# Patient Record
Sex: FEMALE | Race: WHITE | NOT HISPANIC OR LATINO | Employment: OTHER | URBAN - METROPOLITAN AREA
[De-identification: names, ages, dates, MRNs, and addresses within clinical notes are randomized per-mention and may not be internally consistent; named-entity substitution may affect disease eponyms.]

---

## 2018-05-23 ENCOUNTER — HOSPITAL ENCOUNTER (OUTPATIENT)
Dept: RADIOLOGY | Facility: HOSPITAL | Age: 58
Discharge: HOME | End: 2018-05-23
Attending: OBSTETRICS & GYNECOLOGY
Payer: COMMERCIAL

## 2018-05-23 ENCOUNTER — OFFICE VISIT (OUTPATIENT)
Dept: OBSTETRICS AND GYNECOLOGY | Facility: CLINIC | Age: 58
End: 2018-05-23
Payer: COMMERCIAL

## 2018-05-23 VITALS — HEIGHT: 66 IN

## 2018-05-23 DIAGNOSIS — B97.7 HPV (HUMAN PAPILLOMA VIRUS) INFECTION: ICD-10-CM

## 2018-05-23 DIAGNOSIS — Z01.419 WELL WOMAN EXAM WITH ROUTINE GYNECOLOGICAL EXAM: ICD-10-CM

## 2018-05-23 DIAGNOSIS — Z01.419 WELL WOMAN EXAM WITH ROUTINE GYNECOLOGICAL EXAM: Primary | ICD-10-CM

## 2018-05-23 PROCEDURE — 77067 SCR MAMMO BI INCL CAD: CPT

## 2018-05-23 PROCEDURE — S0612 ANNUAL GYNECOLOGICAL EXAMINA: HCPCS | Performed by: OBSTETRICS & GYNECOLOGY

## 2018-05-23 RX ORDER — PREGABALIN 150 MG/1
CAPSULE ORAL
Refills: 0 | COMMUNITY
Start: 2018-05-14

## 2018-05-23 RX ORDER — METFORMIN HYDROCHLORIDE 500 MG/1
TABLET, EXTENDED RELEASE ORAL
Refills: 2 | Status: ON HOLD | COMMUNITY
Start: 2018-05-03 | End: 2021-04-17 | Stop reason: SDUPTHER

## 2018-05-23 RX ORDER — MELOXICAM 15 MG/1
TABLET ORAL
Refills: 2 | COMMUNITY
Start: 2018-05-14 | End: 2021-04-17 | Stop reason: HOSPADM

## 2018-05-23 RX ORDER — ATORVASTATIN CALCIUM 20 MG/1
20 TABLET, FILM COATED ORAL NIGHTLY
Refills: 0 | COMMUNITY
Start: 2018-04-20

## 2018-05-23 RX ORDER — CITALOPRAM 10 MG/1
5 TABLET ORAL
Refills: 2 | COMMUNITY
Start: 2018-04-25

## 2018-05-23 ASSESSMENT — ENCOUNTER SYMPTOMS
CONSTITUTIONAL NEGATIVE: 1
GASTROINTESTINAL NEGATIVE: 1
PSYCHIATRIC NEGATIVE: 1
CARDIOVASCULAR NEGATIVE: 1
NEUROLOGICAL NEGATIVE: 1
RESPIRATORY NEGATIVE: 1
ENDOCRINE NEGATIVE: 1

## 2018-05-23 NOTE — PROGRESS NOTES
"2018  Maylin Stiles is a 57 y.o. female who presents for annual exam.   Periods are absent, denies bleeding, hot flashes improving  The patient is sexually active.  GYN screening history: last pap: was abnormal: Positive HPV, normal Pap. Domestic violence: no.     Current contraception: post menopausal status  History of abnormal Pap smear: yes - Positive HPV  Family history of uterine or ovarian cancer: no  Regular self breast exam: yes  History of abnormal mammogram: no  Family history of breast cancer: no  History of abnormal lipids: yes - Medication  Menstrual History:  OB History      Para Term  AB Living    2       1 1    SAB TAB Ectopic Multiple Live Births    1                 Menarche age: 11  No LMP recorded. Patient is postmenopausal.         Review of Systems and Physical Exam  The following have been reviewed and updated as appropriate in this visit:      Ht 1.676 m (5' 6\")   Breastfeeding? No   HPI  Review of Systems   Constitutional: Negative.    HENT: Negative.    Respiratory: Negative.    Cardiovascular: Negative.    Gastrointestinal: Negative.    Endocrine: Negative.    Genitourinary: Negative.    Breast: Negative.   Neurological: Negative.    Psychiatric/Behavioral: Negative.      Physical Exam   Constitutional: She appears well-developed and well-nourished. She is cooperative.   Genitourinary: Uterus normal. Body Habitus limits findings.Pelvic exam was performed with patient in lithotomy exam position.   No labial rash. No signs of erythema. There is no injury on the right external genitalia. There is no injury on the left external genitalia.   Urethral meatus normal.   Urethra normal.   Normal bladder. Vagina exhibits no lesion. No erythema or tenderness in the vagina. No signs of injury around the vagina. No vaginal discharge found. There is no vaginal vault prolapse, cystocele, uterine prolapse, rectocele or enterocele present. Perineum intact  Cervix exam " normal.  Uterus is normal. Uterine contour is regular.   Right adnexum non-palpable.   Left adnexum non-palpable.   HENT:   Head: Normocephalic.   Eyes: Conjunctivae and lids are normal.   Cardiovascular: Normal rate and regular rhythm.    Pulmonary/Chest: Right breast exhibits no mass, no nipple discharge and no skin change. Left breast exhibits no mass, no nipple discharge and no skin change.   Abdominal: Soft. Normal appearance.   Musculoskeletal: Normal range of motion.   Neurological: She is alert.   Skin: Skin is intact.   Psychiatric: She has a normal mood and affect. Her speech is normal and behavior is normal.       Problem List Items Addressed This Visit     Well woman exam with routine gynecological exam - Primary     Calcium, vitamin D, weightbearing exercise         HPV (human papilloma virus) infection     Negative pathology on colposcopy  Pap was negative at time of positive HPV  Check Pap results           .  All questions answered.  Await pap smear results.  Breast self exam technique reviewed and patient encouraged to perform self-exam monthly.  Discussed healthy lifestyle modifications.  Educational material distributed.  Follow up as needed.  Mammogram.  Thin prep Pap smear..  Return in about 1 year (around 5/23/2019).  Analy Soto MD

## 2018-05-23 NOTE — PATIENT INSTRUCTIONS
Patient Education     Bone Health  Bones protect organs, store calcium, and anchor muscles. Good health habits, such as eating nutritious foods and exercising regularly, are important for maintaining healthy bones. They can also help to prevent a condition that causes bones to lose density and become weak and brittle (osteoporosis).  Why is bone mass important?  Bone mass refers to the amount of bone tissue that you have. The higher your bone mass, the stronger your bones. An important step toward having healthy bones throughout life is to have strong and dense bones during childhood. A young adult who has a high bone mass is more likely to have a high bone mass later in life. Bone mass at its greatest it is called peak bone mass.  A large decline in bone mass occurs in older adults. In women, it occurs about the time of menopause. During this time, it is important to practice good health habits, because if more bone is lost than what is replaced, the bones will become less healthy and more likely to break (fracture). If you find that you have a low bone mass, you may be able to prevent osteoporosis or further bone loss by changing your diet and lifestyle.  How can I find out if my bone mass is low?  Bone mass can be measured with an X-ray test that is called a bone mineral density (BMD) test. This test is recommended for all women who are age 65 or older. It may also be recommended for men who are age 70 or older, or for people who are more likely to develop osteoporosis due to:  · Having bones that break easily.  · Having a long-term disease that weakens bones, such as kidney disease or rheumatoid arthritis.  · Having menopause earlier than normal.  · Taking medicine that weakens bones, such as steroids, thyroid hormones, or hormone treatment for breast cancer or prostate cancer.  · Smoking.  · Drinking three or more alcoholic drinks each day.  What are the nutritional recommendations for healthy bones?  To have  healthy bones, you need to get enough of the right minerals and vitamins. Most nutrition experts recommend getting these nutrients from the foods that you eat. Nutritional recommendations vary from person to person. Ask your health care provider what is healthy for you. Here are some general guidelines.  Calcium Recommendations  Calcium is the most important (essential) mineral for bone health. Most people can get enough calcium from their diet, but supplements may be recommended for people who are at risk for osteoporosis. Good sources of calcium include:  · Dairy products, such as low-fat or nonfat milk, cheese, and yogurt.  · Dark green leafy vegetables, such as bok david and broccoli.  · Calcium-fortified foods, such as orange juice, cereal, bread, soy beverages, and tofu products.  · Nuts, such as almonds.  Follow these recommended amounts for daily calcium intake:  · Children, age 1?3: 700 mg.  · Children, age 4?8: 1,000 mg.  · Children, age 9?13: 1,300 mg.  · Teens, age 14?18: 1,300 mg.  · Adults, age 19?50: 1,000 mg.  · Adults, age 51?70:  ¨ Men: 1,000 mg.  ¨ Women: 1,200 mg.  · Adults, age 71 or older: 1,200 mg.  · Pregnant and breastfeeding females:  ¨ Teens: 1,300 mg.  ¨ Adults: 1,000 mg.  Vitamin D Recommendations  Vitamin D is the most essential vitamin for bone health. It helps the body to absorb calcium. Sunlight stimulates the skin to make vitamin D, so be sure to get enough sunlight. If you live in a cold climate or you do not get outside often, your health care provider may recommend that you take vitamin D supplements. Good sources of vitamin D in your diet include:  · Egg yolks.  · Saltwater fish.  · Milk and cereal fortified with vitamin D.  Follow these recommended amounts for daily vitamin D intake:  · Children and teens, age 1?18: 600 international units.  · Adults, age 50 or younger: 400-800 international units.  · Adults, age 51 or older: 800-1,000 international units.  Other  Nutrients  Other nutrients for bone health include:  · Phosphorus. This mineral is found in meat, poultry, dairy foods, nuts, and legumes. The recommended daily intake for adult men and adult women is 700 mg.  · Magnesium. This mineral is found in seeds, nuts, dark green vegetables, and legumes. The recommended daily intake for adult men is 400?420 mg. For adult women, it is 310?320 mg.  · Vitamin K. This vitamin is found in green leafy vegetables. The recommended daily intake is 120 mg for adult men and 90 mg for adult women.  What type of physical activity is best for building and maintaining healthy bones?  Weight-bearing and strength-building activities are important for building and maintaining peak bone mass. Weight-bearing activities cause muscles and bones to work against gravity. Strength-building activities increases muscle strength that supports bones. Weight-bearing and muscle-building activities include:  · Walking and hiking.  · Jogging and running.  · Dancing.  · Gym exercises.  · Lifting weights.  · Tennis and racquetball.  · Climbing stairs.  · Aerobics.  Adults should get at least 30 minutes of moderate physical activity on most days. Children should get at least 60 minutes of moderate physical activity on most days. Ask your health care provide what type of exercise is best for you.  Where can I find more information?  For more information, check out the following websites:  · National Osteoporosis Foundation: http://nof.org/learn/basics  · National Institutes of Health: http://www.niams.nih.gov/Health_Info/Bone/Bone_Health/bone_health_for_life.asp  This information is not intended to replace advice given to you by your health care provider. Make sure you discuss any questions you have with your health care provider.  Document Released: 03/09/2005 Document Revised: 07/07/2017 Document Reviewed: 12/23/2015  [employer] Patient Education © 2018 [employer]

## 2018-06-05 LAB
CYTOLOGIST CVX/VAG CYTO: ABNORMAL
CYTOLOGY CVX/VAG DOC THIN PREP: ABNORMAL
DX ICD CODE: ABNORMAL
HPV I/H RISK 4 DNA CVX QL PROBE+SIG AMP: POSITIVE
HPV16 DNA CVX QL PROBE+SIG AMP: NEGATIVE
HPV18+45 E6+E7 MRNA CVX QL NAA+PROBE: NEGATIVE
LAB CORP NOTE:: ABNORMAL
OTHER STN SPEC: ABNORMAL
PATH REPORT.FINAL DX SPEC: ABNORMAL
STAT OF ADQ CVX/VAG CYTO-IMP: ABNORMAL

## 2018-08-08 ENCOUNTER — TELEPHONE (OUTPATIENT)
Dept: OBSTETRICS AND GYNECOLOGY | Facility: CLINIC | Age: 58
End: 2018-08-08

## 2019-05-08 ENCOUNTER — HOSPITAL ENCOUNTER (OUTPATIENT)
Facility: CLINIC | Age: 59
Discharge: HOME | End: 2019-05-08
Attending: EMERGENCY MEDICINE
Payer: COMMERCIAL

## 2019-05-08 VITALS
OXYGEN SATURATION: 98 % | BODY MASS INDEX: 32.14 KG/M2 | WEIGHT: 200 LBS | TEMPERATURE: 98.2 F | DIASTOLIC BLOOD PRESSURE: 73 MMHG | HEART RATE: 87 BPM | RESPIRATION RATE: 18 BRPM | SYSTOLIC BLOOD PRESSURE: 120 MMHG | HEIGHT: 66 IN

## 2019-05-08 DIAGNOSIS — R21 RASH AND OTHER NONSPECIFIC SKIN ERUPTION: Primary | ICD-10-CM

## 2019-05-08 PROCEDURE — 99203 OFFICE O/P NEW LOW 30 MIN: CPT | Performed by: EMERGENCY MEDICINE

## 2019-05-08 PROCEDURE — S9088 SERVICES PROVIDED IN URGENT: HCPCS | Performed by: EMERGENCY MEDICINE

## 2019-05-08 RX ORDER — METHYLPREDNISOLONE 4 MG/1
TABLET ORAL
Qty: 21 TABLET | Refills: 0 | Status: SHIPPED | OUTPATIENT
Start: 2019-05-08 | End: 2019-05-15

## 2019-05-08 ASSESSMENT — ENCOUNTER SYMPTOMS
WEAKNESS: 0
PALPITATIONS: 0
BACK PAIN: 0
NUMBNESS: 0
HEMATURIA: 0
NAUSEA: 0
DYSURIA: 0
COUGH: 0
ARTHRALGIAS: 0
EYE PAIN: 0
ADENOPATHY: 0
SORE THROAT: 0
ABDOMINAL PAIN: 0
FEVER: 0
SHORTNESS OF BREATH: 0
COLOR CHANGE: 0
CHILLS: 0
VOMITING: 0
WHEEZING: 0

## 2019-05-08 NOTE — DISCHARGE INSTRUCTIONS
Please stop the penicillin and the body lotion immediately. Follow up with your doctor for allergy testing as soon as possible.

## 2019-05-08 NOTE — ED PROVIDER NOTES
History  Chief Complaint   Patient presents with   • Rash     all over your body     59 yo female with a history of DM and fibromyalgia presents with a generalized rash x 1 day. The patient reports a diffuse red itchy rash to her trunk, neck, and upper extremities since last night. The patient is on day #8/10 of penicillin for a dental infection and used a new body lotion yesterday morning. No other new soaps, detergents, lotions, foods, or medications. The patient denies shortness of breath, wheezing, and tongue swelling. No fevers/chills. No other specific complaints.            Past Medical History:   Diagnosis Date   • Diabetes mellitus (CMS/HCC) (HCC)    • Fibromyalgia        Past Surgical History:   Procedure Laterality Date   • OVARIAN CYST REMOVAL         Family History   Problem Relation Age of Onset   • Diabetes Father    • Breast cancer Mother    • Diabetes Mother        Social History   Substance Use Topics   • Smoking status: Former Smoker   • Smokeless tobacco: Former User   • Alcohol use Yes       Review of Systems   Constitutional: Negative for chills and fever.   HENT: Negative for ear pain and sore throat.    Eyes: Negative for pain and visual disturbance.   Respiratory: Negative for cough, shortness of breath and wheezing.    Cardiovascular: Negative for chest pain and palpitations.   Gastrointestinal: Negative for abdominal pain, nausea and vomiting.   Genitourinary: Negative for dysuria and hematuria.   Musculoskeletal: Negative for arthralgias and back pain.   Skin: Positive for rash. Negative for color change.   Neurological: Negative for weakness and numbness.   Hematological: Negative for adenopathy.   All other systems reviewed and are negative.      Physical Exam  ED Triage Vitals [05/08/19 1535]   Temp Heart Rate Resp BP SpO2   36.8 °C (98.2 °F) 87 18 120/73 98 %      Temp Source Heart Rate Source Patient Position BP Location FiO2 (%) (Set)   Oral Monitor -- Left upper arm --        Physical Exam   Constitutional: She appears well-developed and well-nourished. No distress.   HENT:   Head: Normocephalic and atraumatic.   Eyes: Conjunctivae are normal.   Neck: Neck supple.   Cardiovascular: Normal rate and regular rhythm.    No murmur heard.  Pulmonary/Chest: Effort normal and breath sounds normal. No stridor. No respiratory distress.   Abdominal: Soft. There is no tenderness.   Musculoskeletal: She exhibits no edema.   Neurological: She is alert.   Skin: Skin is warm and dry. Rash noted. Rash is urticarial.   Mild diffuse scattered erythematous urticarial rash to trunk, neck, and both upper extremities. No tenderness or drainage.   Psychiatric: She has a normal mood and affect.   Nursing note and vitals reviewed.        Procedures  Procedures    UC Course  Clinical Impressions as of May 08 1616   Rash and other nonspecific skin eruption       MDM  Number of Diagnoses or Management Options  Rash and other nonspecific skin eruption:   Diagnosis management comments: The patient's history and exam are most consistent with a mild allergic reaction, possibly due to the PCN or new body lotion. No signs of airway involvement. Plan to stop the antibiotic/lotion immediately and start a course of oral steroids. The patient was instructed to follow up with her PCP for reassessment and further workup later this week. She is agreeable to this plan. Strict return precautions provided.    Patient Progress  Patient progress: Reza Monsalve MD  05/08/19 9154

## 2019-06-20 ENCOUNTER — OFFICE VISIT (OUTPATIENT)
Dept: OBSTETRICS AND GYNECOLOGY | Facility: CLINIC | Age: 59
End: 2019-06-20
Payer: COMMERCIAL

## 2019-06-20 VITALS — DIASTOLIC BLOOD PRESSURE: 80 MMHG | BODY MASS INDEX: 32.28 KG/M2 | SYSTOLIC BLOOD PRESSURE: 110 MMHG | HEIGHT: 66 IN

## 2019-06-20 DIAGNOSIS — Z01.419 WELL WOMAN EXAM WITH ROUTINE GYNECOLOGICAL EXAM: Primary | ICD-10-CM

## 2019-06-20 DIAGNOSIS — Z80.3 FAMILY HISTORY OF BREAST CANCER IN MOTHER: ICD-10-CM

## 2019-06-20 DIAGNOSIS — B97.7 HPV (HUMAN PAPILLOMA VIRUS) INFECTION: ICD-10-CM

## 2019-06-20 DIAGNOSIS — Z12.31 ENCOUNTER FOR SCREENING MAMMOGRAM FOR BREAST CANCER: ICD-10-CM

## 2019-06-20 PROCEDURE — S0612 ANNUAL GYNECOLOGICAL EXAMINA: HCPCS | Performed by: OBSTETRICS & GYNECOLOGY

## 2019-06-20 RX ORDER — GLIMEPIRIDE 2 MG/1
2 TABLET ORAL EVERY MORNING
Refills: 0 | COMMUNITY
Start: 2019-06-07 | End: 2023-09-25 | Stop reason: ALTCHOICE

## 2019-06-20 ASSESSMENT — ENCOUNTER SYMPTOMS
ENDOCRINE NEGATIVE: 1
CONSTITUTIONAL NEGATIVE: 1
CARDIOVASCULAR NEGATIVE: 1
GASTROINTESTINAL NEGATIVE: 1
RESPIRATORY NEGATIVE: 1
PSYCHIATRIC NEGATIVE: 1
NEUROLOGICAL NEGATIVE: 1

## 2019-06-20 NOTE — PROGRESS NOTES
"2019  Maylin Stiles is a 58 y.o. female who presents for annual exam.   Periods are sent; denies bleeding, hot flashes  ,  The patient is not currently sexually active.  Has health issues GYN screening history: last pap: was abnormal: Positive HPV. Domestic violence: no.     Current contraception: post menopausal status  History of abnormal Pap smear: yes - Multiple  Family history of uterine or ovarian cancer: no  Regular self breast exam: yes  History of abnormal mammogram: no  Family history of breast cancer: yes - Mother  History of abnormal lipids: yes - On medication  Menstrual History:  OB History      Para Term  AB Living    2       1 1    SAB TAB Ectopic Multiple Live Births    1                 No LMP recorded (lmp unknown). Patient is postmenopausal.         Review of Systems and Physical Exam  The following have been reviewed and updated as appropriate in this visit:      /80   Ht 1.676 m (5' 6\")   LMP  (LMP Unknown)   Breastfeeding? No   BMI 32.28 kg/m²   HPI  Review of Systems   Constitutional: Negative.    HENT: Negative.    Respiratory: Negative.    Cardiovascular: Negative.    Gastrointestinal: Negative.    Endocrine: Negative.    Genitourinary: Negative.    Breast: Negative.   Neurological: Negative.    Psychiatric/Behavioral: Negative.      Physical Exam   Constitutional: She appears well-developed and well-nourished. She is cooperative.   Genitourinary: Uterus normal. Pelvic exam was performed with patient in lithotomy exam position.   No labial rash. No signs of erythema. There is no injury on the right external genitalia. There is no injury on the left external genitalia.   Urethral meatus normal.   Urethra normal.   Normal bladder. Vagina exhibits no lesion. No erythema or tenderness in the vagina. No signs of injury around the vagina. No vaginal discharge found. There is no vaginal vault prolapse, cystocele, uterine prolapse, rectocele or enterocele present. " Perineum intact  Cervix exam normal.  Uterus is normal. Uterine contour is regular. Uterus is anteflexed.   Right adnexum non-palpable.   Left adnexum non-palpable.   HENT:   Head: Normocephalic.   Eyes: Conjunctivae and lids are normal.   Cardiovascular: Normal rate and regular rhythm.    Pulmonary/Chest: Right breast exhibits no mass, no nipple discharge and no skin change. Left breast exhibits no mass, no nipple discharge and no skin change.   Abdominal: Soft. Normal appearance.   Musculoskeletal: Normal range of motion.   Neurological: She is alert.   Skin: Skin is intact.   Psychiatric: She has a normal mood and affect. Her speech is normal and behavior is normal.       Problem List Items Addressed This Visit     Well woman exam with routine gynecological exam - Primary     Calcium, vitamin D weightbearing exercise         Relevant Orders    Cytology, Thinprep Pap    PAP AND HR HPV W/REFLEX TO GENOTYPING 16,18/45    HPV (human papilloma virus) infection     Non 16/18, negative Pap last year  Check Pap         Relevant Orders    Cytology, Thinprep Pap    PAP AND HR HPV W/REFLEX TO GENOTYPING 16,18/45    Encounter for screening mammogram for breast cancer     Rx given         Relevant Orders    BI SCREENING MAMMOGRAM BILATERAL    Family history of breast cancer in mother     Postmenopausal  Continue annual screening           .  All questions answered.  Await pap smear results.  Breast self exam technique reviewed and patient encouraged to perform self-exam monthly.  Diagnosis explained in detail, including differential.  Discussed healthy lifestyle modifications.  Follow up as needed.  Mammogram.  Thin prep Pap smear..  No Follow-up on file.  Analy Soto MD

## 2019-06-20 NOTE — ASSESSMENT & PLAN NOTE
Calcium, vitamin D weightbearing exercise   Pt walked in hallway and did half a lap. Will walk again after lunch and try for a whole lap.

## 2019-06-25 ENCOUNTER — HOSPITAL ENCOUNTER (OUTPATIENT)
Dept: RADIOLOGY | Age: 59
Discharge: HOME | End: 2019-06-25
Attending: OBSTETRICS & GYNECOLOGY
Payer: COMMERCIAL

## 2019-06-25 DIAGNOSIS — Z12.31 ENCOUNTER FOR SCREENING MAMMOGRAM FOR BREAST CANCER: ICD-10-CM

## 2019-06-25 LAB
CYTOLOGIST CVX/VAG CYTO: ABNORMAL
CYTOLOGY CVX/VAG DOC CYTO: ABNORMAL
CYTOLOGY CVX/VAG DOC THIN PREP: ABNORMAL
DX ICD CODE: ABNORMAL
DX ICD CODE: ABNORMAL
HPV I/H RISK 4 DNA CVX QL PROBE+SIG AMP: POSITIVE
LAB CORP NOTE:: ABNORMAL
OTHER STN SPEC: ABNORMAL
PATHOLOGIST CVX/VAG CYTO: ABNORMAL
RECOM F/U CVX/VAG CYTO: ABNORMAL
STAT OF ADQ CVX/VAG CYTO-IMP: ABNORMAL

## 2019-06-25 PROCEDURE — 77063 BREAST TOMOSYNTHESIS BI: CPT

## 2019-08-01 ENCOUNTER — TELEPHONE (OUTPATIENT)
Dept: OBSTETRICS AND GYNECOLOGY | Facility: CLINIC | Age: 59
End: 2019-08-01

## 2019-08-01 NOTE — TELEPHONE ENCOUNTER
Message left; ASCUS, positive HPV again.  Patient needs repeat colposcopy.  Call back with any questions

## 2019-10-04 ENCOUNTER — TRANSCRIBE ORDERS (OUTPATIENT)
Dept: SCHEDULING | Age: 59
End: 2019-10-04

## 2019-10-04 DIAGNOSIS — N95.1 MENOPAUSAL AND FEMALE CLIMACTERIC STATES: Primary | ICD-10-CM

## 2019-10-21 ENCOUNTER — HOSPITAL ENCOUNTER (OUTPATIENT)
Dept: RADIOLOGY | Age: 59
Discharge: HOME | End: 2019-10-21
Attending: FAMILY MEDICINE
Payer: COMMERCIAL

## 2019-10-21 DIAGNOSIS — N95.1 MENOPAUSAL AND FEMALE CLIMACTERIC STATES: ICD-10-CM

## 2019-10-21 PROCEDURE — 77080 DXA BONE DENSITY AXIAL: CPT

## 2021-04-09 ENCOUNTER — APPOINTMENT (EMERGENCY)
Dept: RADIOLOGY | Facility: HOSPITAL | Age: 61
DRG: 871 | End: 2021-04-09
Attending: EMERGENCY MEDICINE
Payer: COMMERCIAL

## 2021-04-09 ENCOUNTER — HOSPITAL ENCOUNTER (INPATIENT)
Facility: HOSPITAL | Age: 61
LOS: 8 days | Discharge: HOME | DRG: 871 | End: 2021-04-17
Attending: EMERGENCY MEDICINE | Admitting: HOSPITALIST
Payer: COMMERCIAL

## 2021-04-09 DIAGNOSIS — U07.1 PNEUMONIA DUE TO COVID-19 VIRUS: ICD-10-CM

## 2021-04-09 DIAGNOSIS — R09.02 HYPOXIA: ICD-10-CM

## 2021-04-09 DIAGNOSIS — J12.82 PNEUMONIA DUE TO COVID-19 VIRUS: ICD-10-CM

## 2021-04-09 DIAGNOSIS — J96.01 ACUTE RESPIRATORY FAILURE WITH HYPOXIA (CMS/HCC): Primary | ICD-10-CM

## 2021-04-09 PROBLEM — M79.7 FIBROMYALGIA: Status: ACTIVE | Noted: 2021-04-09

## 2021-04-09 PROBLEM — E11.9 TYPE 2 DIABETES MELLITUS, WITHOUT LONG-TERM CURRENT USE OF INSULIN (CMS/HCC): Status: ACTIVE | Noted: 2021-04-09

## 2021-04-09 LAB
ALBUMIN SERPL-MCNC: 4.1 G/DL (ref 3.4–5)
ALP SERPL-CCNC: 58 IU/L (ref 35–126)
ALT SERPL-CCNC: 23 IU/L (ref 11–54)
ANION GAP SERPL CALC-SCNC: 9 MEQ/L (ref 3–15)
APTT PPP: 36 SEC (ref 23–35)
AST SERPL-CCNC: 38 IU/L (ref 15–41)
BASOPHILS # BLD: 0.01 K/UL (ref 0.01–0.1)
BASOPHILS NFR BLD: 0.3 %
BILIRUB SERPL-MCNC: 0.7 MG/DL (ref 0.3–1.2)
BUN SERPL-MCNC: 18 MG/DL (ref 8–20)
CALCIUM SERPL-MCNC: 8.6 MG/DL (ref 8.9–10.3)
CHLORIDE SERPL-SCNC: 102 MEQ/L (ref 98–109)
CO2 SERPL-SCNC: 24 MEQ/L (ref 22–32)
CREAT SERPL-MCNC: 0.9 MG/DL (ref 0.6–1.1)
CRP SERPL-MCNC: 74 MG/L
CRP SERPL-MCNC: 75.1 MG/L
DIFFERENTIAL METHOD BLD: ABNORMAL
EOSINOPHIL # BLD: 0 K/UL (ref 0.04–0.36)
EOSINOPHIL NFR BLD: 0 %
ERYTHROCYTE [DISTWIDTH] IN BLOOD BY AUTOMATED COUNT: 12.8 % (ref 11.7–14.4)
GFR SERPL CREATININE-BSD FRML MDRD: >60 ML/MIN/1.73M*2
GLUCOSE SERPL-MCNC: 170 MG/DL (ref 70–99)
HCT VFR BLDCO AUTO: 41.2 % (ref 35–45)
HGB BLD-MCNC: 13.8 G/DL (ref 11.8–15.7)
IMM GRANULOCYTES # BLD AUTO: 0.01 K/UL (ref 0–0.08)
IMM GRANULOCYTES NFR BLD AUTO: 0.3 %
INR PPP: 1
LDH SERPL L TO P-CCNC: 257 IU/L (ref 98–192)
LYMPHOCYTES # BLD: 0.69 K/UL (ref 1.2–3.5)
LYMPHOCYTES NFR BLD: 18.8 %
MCH RBC QN AUTO: 31.8 PG (ref 28–33.2)
MCHC RBC AUTO-ENTMCNC: 33.5 G/DL (ref 32.2–35.5)
MCV RBC AUTO: 94.9 FL (ref 83–98)
MONOCYTES # BLD: 0.18 K/UL (ref 0.28–0.8)
MONOCYTES NFR BLD: 4.9 %
NEUTROPHILS # BLD: 2.79 K/UL (ref 1.7–7)
NEUTS SEG NFR BLD: 75.7 %
NRBC BLD-RTO: 0 %
PDW BLD AUTO: 11.8 FL (ref 9.4–12.3)
PLAT MORPH BLD: NORMAL
PLATELET # BLD AUTO: 149 K/UL (ref 150–369)
PLATELET # BLD EST: ABNORMAL 10*3/UL
POTASSIUM SERPL-SCNC: 4.8 MEQ/L (ref 3.6–5.1)
PROT SERPL-MCNC: 7.4 G/DL (ref 6–8.2)
PROTHROMBIN TIME: 12.8 SEC (ref 12.2–14.5)
RBC # BLD AUTO: 4.34 M/UL (ref 3.93–5.22)
SODIUM SERPL-SCNC: 135 MEQ/L (ref 136–144)
TROPONIN I SERPL-MCNC: <0.02 NG/ML
WBC # BLD AUTO: 3.68 K/UL (ref 3.8–10.5)

## 2021-04-09 PROCEDURE — 86140 C-REACTIVE PROTEIN: CPT | Performed by: HOSPITALIST

## 2021-04-09 PROCEDURE — 86140 C-REACTIVE PROTEIN: CPT | Performed by: PHYSICIAN ASSISTANT

## 2021-04-09 PROCEDURE — 84484 ASSAY OF TROPONIN QUANT: CPT | Performed by: PHYSICIAN ASSISTANT

## 2021-04-09 PROCEDURE — 63600000 HC DRUGS/DETAIL CODE: Performed by: PHYSICIAN ASSISTANT

## 2021-04-09 PROCEDURE — XW033E5 INTRODUCTION OF REMDESIVIR ANTI-INFECTIVE INTO PERIPHERAL VEIN, PERCUTANEOUS APPROACH, NEW TECHNOLOGY GROUP 5: ICD-10-PCS | Performed by: INTERNAL MEDICINE

## 2021-04-09 PROCEDURE — 96374 THER/PROPH/DIAG INJ IV PUSH: CPT

## 2021-04-09 PROCEDURE — 80053 COMPREHEN METABOLIC PANEL: CPT | Performed by: PHYSICIAN ASSISTANT

## 2021-04-09 PROCEDURE — 71045 X-RAY EXAM CHEST 1 VIEW: CPT

## 2021-04-09 PROCEDURE — 20600000 HC ROOM AND CARE INTERMEDIATE/TELEMETRY

## 2021-04-09 PROCEDURE — 83615 LACTATE (LD) (LDH) ENZYME: CPT | Performed by: HOSPITALIST

## 2021-04-09 PROCEDURE — 85730 THROMBOPLASTIN TIME PARTIAL: CPT | Performed by: PHYSICIAN ASSISTANT

## 2021-04-09 PROCEDURE — 25800000 HC PHARMACY IV SOLUTIONS: Performed by: PHYSICIAN ASSISTANT

## 2021-04-09 PROCEDURE — 99285 EMERGENCY DEPT VISIT HI MDM: CPT | Mod: 25

## 2021-04-09 PROCEDURE — 85025 COMPLETE CBC W/AUTO DIFF WBC: CPT | Performed by: PHYSICIAN ASSISTANT

## 2021-04-09 PROCEDURE — 63700000 HC SELF-ADMINISTRABLE DRUG: Performed by: PHYSICIAN ASSISTANT

## 2021-04-09 PROCEDURE — 85610 PROTHROMBIN TIME: CPT | Performed by: PHYSICIAN ASSISTANT

## 2021-04-09 PROCEDURE — 63700000 HC SELF-ADMINISTRABLE DRUG: Performed by: HOSPITALIST

## 2021-04-09 PROCEDURE — 93005 ELECTROCARDIOGRAM TRACING: CPT | Performed by: PHYSICIAN ASSISTANT

## 2021-04-09 PROCEDURE — 36415 COLL VENOUS BLD VENIPUNCTURE: CPT | Performed by: PHYSICIAN ASSISTANT

## 2021-04-09 RX ORDER — DEXAMETHASONE SODIUM PHOSPHATE 4 MG/ML
6 INJECTION, SOLUTION INTRA-ARTICULAR; INTRALESIONAL; INTRAMUSCULAR; INTRAVENOUS; SOFT TISSUE ONCE
Status: COMPLETED | OUTPATIENT
Start: 2021-04-09 | End: 2021-04-09

## 2021-04-09 RX ORDER — ALBUTEROL SULFATE 90 UG/1
2 INHALANT RESPIRATORY (INHALATION) ONCE
Status: COMPLETED | OUTPATIENT
Start: 2021-04-09 | End: 2021-04-09

## 2021-04-09 RX ORDER — CITALOPRAM 10 MG/1
5 TABLET ORAL DAILY
Status: DISCONTINUED | OUTPATIENT
Start: 2021-04-10 | End: 2021-04-17 | Stop reason: HOSPADM

## 2021-04-09 RX ORDER — GLIMEPIRIDE 2 MG/1
2 TABLET ORAL EVERY MORNING
Status: DISCONTINUED | OUTPATIENT
Start: 2021-04-10 | End: 2021-04-10

## 2021-04-09 RX ORDER — ALBUTEROL SULFATE 90 UG/1
2 INHALANT RESPIRATORY (INHALATION) EVERY 6 HOURS PRN
Status: DISCONTINUED | OUTPATIENT
Start: 2021-04-09 | End: 2021-04-17 | Stop reason: HOSPADM

## 2021-04-09 RX ORDER — ACETAMINOPHEN 325 MG/1
650 TABLET ORAL EVERY 4 HOURS PRN
Status: DISCONTINUED | OUTPATIENT
Start: 2021-04-09 | End: 2021-04-17 | Stop reason: HOSPADM

## 2021-04-09 RX ORDER — PREGABALIN 75 MG/1
150 CAPSULE ORAL 2 TIMES DAILY
Status: DISCONTINUED | OUTPATIENT
Start: 2021-04-09 | End: 2021-04-17 | Stop reason: HOSPADM

## 2021-04-09 RX ORDER — DEXAMETHASONE SODIUM PHOSPHATE 4 MG/ML
6 INJECTION, SOLUTION INTRA-ARTICULAR; INTRALESIONAL; INTRAMUSCULAR; INTRAVENOUS; SOFT TISSUE DAILY
Status: DISCONTINUED | OUTPATIENT
Start: 2021-04-10 | End: 2021-04-17 | Stop reason: HOSPADM

## 2021-04-09 RX ORDER — IBUPROFEN 600 MG/1
600 TABLET ORAL ONCE
Status: COMPLETED | OUTPATIENT
Start: 2021-04-09 | End: 2021-04-09

## 2021-04-09 RX ORDER — INSULIN ASPART 100 [IU]/ML
6-10 INJECTION, SOLUTION INTRAVENOUS; SUBCUTANEOUS
Status: DISCONTINUED | OUTPATIENT
Start: 2021-04-09 | End: 2021-04-12

## 2021-04-09 RX ORDER — ATORVASTATIN CALCIUM 20 MG/1
20 TABLET, FILM COATED ORAL NIGHTLY
Status: DISCONTINUED | OUTPATIENT
Start: 2021-04-09 | End: 2021-04-17 | Stop reason: HOSPADM

## 2021-04-09 RX ORDER — ACETAMINOPHEN 325 MG/1
650 TABLET ORAL ONCE
Status: COMPLETED | OUTPATIENT
Start: 2021-04-09 | End: 2021-04-09

## 2021-04-09 RX ADMIN — ACETAMINOPHEN 650 MG: 325 TABLET ORAL at 22:35

## 2021-04-09 RX ADMIN — IBUPROFEN 600 MG: 600 TABLET, FILM COATED ORAL at 18:28

## 2021-04-09 RX ADMIN — SODIUM CHLORIDE 1000 ML: 9 INJECTION, SOLUTION INTRAVENOUS at 18:40

## 2021-04-09 RX ADMIN — ACETAMINOPHEN 650 MG: 325 TABLET ORAL at 18:28

## 2021-04-09 RX ADMIN — ALBUTEROL SULFATE 2 PUFF: 90 AEROSOL, METERED RESPIRATORY (INHALATION) at 18:52

## 2021-04-09 RX ADMIN — ATORVASTATIN CALCIUM 20 MG: 20 TABLET, FILM COATED ORAL at 22:36

## 2021-04-09 RX ADMIN — PREGABALIN 150 MG: 75 CAPSULE ORAL at 21:09

## 2021-04-09 RX ADMIN — DEXAMETHASONE SODIUM PHOSPHATE 6 MG: 4 INJECTION, SOLUTION INTRA-ARTICULAR; INTRALESIONAL; INTRAMUSCULAR; INTRAVENOUS; SOFT TISSUE at 19:19

## 2021-04-09 ASSESSMENT — ENCOUNTER SYMPTOMS
CHEST TIGHTNESS: 1
WHEEZING: 0
FEVER: 1
SHORTNESS OF BREATH: 1
COUGH: 1
MYALGIAS: 1
FLANK PAIN: 0
ABDOMINAL PAIN: 0
PALPITATIONS: 0
FLU SYMPTOMS: 1
VOMITING: 0
DIFFICULTY URINATING: 0
MENTAL STATUS CHANGE: 0
WEAKNESS: 0
NUMBNESS: 0
LIGHT-HEADEDNESS: 0
DECREASED PHYSICAL ACTIVITY: 1
DECREASED APPETITE: 1
FATIGUE: 1

## 2021-04-09 NOTE — ED PROVIDER NOTES
Emergency Medicine Note  HPI   HISTORY OF PRESENT ILLNESS       History provided by:  Patient   used: No    Flu Symptoms  Presenting symptoms: cough, fatigue, fever, myalgias and shortness of breath    Presenting symptoms: no vomiting    Severity:  Moderate  Onset quality:  Gradual  Duration:  5 days  Progression:  Worsening  Chronicity:  New  Relieved by:  Nothing  Ineffective treatments:  None tried  Associated symptoms: decreased appetite and decreased physical activity    Associated symptoms: no mental status change, no congestion and no syncope    Risk factors: diabetes          Patient History   PAST HISTORY     Reviewed from Nursing Triage:      Past Medical History:   Diagnosis Date   • Diabetes mellitus (CMS/HCC)    • Fibromyalgia        Past Surgical History:   Procedure Laterality Date   • OVARIAN CYST REMOVAL         Family History   Problem Relation Age of Onset   • Diabetes Biological Father    • Breast cancer Biological Mother    • Diabetes Biological Mother        Social History     Tobacco Use   • Smoking status: Former Smoker   • Smokeless tobacco: Former User   Substance Use Topics   • Alcohol use: Not Currently   • Drug use: No         Review of Systems   REVIEW OF SYSTEMS     Review of Systems   Constitutional: Positive for decreased appetite, fatigue and fever.   HENT: Negative for congestion.    Respiratory: Positive for cough, chest tightness and shortness of breath. Negative for wheezing.    Cardiovascular: Negative for chest pain, palpitations and leg swelling.   Gastrointestinal: Negative for abdominal pain and vomiting.   Genitourinary: Negative for difficulty urinating and flank pain.   Musculoskeletal: Positive for myalgias.   Skin: Negative for rash.   Neurological: Negative for syncope, weakness, light-headedness and numbness.         VITALS     ED Vitals    Date/Time Temp Pulse Resp BP SpO2 [facility]   04/09/21 1925 38.2 °C (100.8 °F) -- -- -- 93 % KM   04/09/21 1910  -- -- -- -- 86 % KMM   04/09/21 1905 -- 97 20 140/68 92 % SFC   04/09/21 1707 38.8 °C (101.8 °F) 96 20 146/72 95 % EW        Pulse Ox %: 86 % (04/09/21 1938)  Pulse Ox Interpretation: Low (04/09/21 1938)  Heart Rate: 96 (04/09/21 1948)  Rhythm Strip Interpretation: Normal Sinus Rhythm (04/09/21 1948)     Physical Exam   PHYSICAL EXAM     Physical Exam  Constitutional:       General: She is not in acute distress.     Appearance: She is not ill-appearing or toxic-appearing.   HENT:      Head: Normocephalic.      Mouth/Throat:      Mouth: Mucous membranes are moist.   Eyes:      Conjunctiva/sclera: Conjunctivae normal.   Cardiovascular:      Rate and Rhythm: Normal rate and regular rhythm.   Pulmonary:      Effort: Pulmonary effort is normal.      Breath sounds: Examination of the right-lower field reveals decreased breath sounds. Examination of the left-lower field reveals decreased breath sounds. Decreased breath sounds present. No wheezing.   Abdominal:      Tenderness: There is no abdominal tenderness. There is no guarding or rebound.   Musculoskeletal:         General: No swelling.      Right lower leg: No edema.      Left lower leg: No edema.   Skin:     General: Skin is warm and dry.   Neurological:      General: No focal deficit present.      Mental Status: She is alert and oriented to person, place, and time.           PROCEDURES     Procedures     DATA     Results     Procedure Component Value Units Date/Time    Troponin I [[id number]] Collected: 04/09/21 1839    Specimen: Blood, Venous Updated: 04/09/21 1954    CBC and differential [[id number]]  (Abnormal) Collected: 04/09/21 1839    Specimen: Blood, Venous Updated: 04/09/21 1940     WBC 3.68 K/uL      RBC 4.34 M/uL      Hemoglobin 13.8 g/dL      Hematocrit 41.2 %      MCV 94.9 fL      MCH 31.8 pg      MCHC 33.5 g/dL      RDW 12.8 %      Platelets 149 K/uL      MPV 11.8 fL      Differential Type Auto     nRBC 0.0 %      Immature Granulocytes 0.3 %       Neutrophils 75.7 %      Lymphocytes 18.8 %      Monocytes 4.9 %      Eosinophils 0.0 %      Basophils 0.3 %      Immature Granulocytes, Absolute 0.01 K/uL      Neutrophils, Absolute 2.79 K/uL      Lymphocytes, Absolute 0.69 K/uL      Monocytes, Absolute 0.18 K/uL      Eosinophils, Absolute 0.00 K/uL      Basophils, Absolute 0.01 K/uL      PLT Morphology Normal     Platelet Estimate Decreased (51,000-149,000)    Comprehensive metabolic panel [[id number]]  (Abnormal) Collected: 04/09/21 1839    Specimen: Blood, Venous Updated: 04/09/21 1912     Sodium 135 mEQ/L      Potassium 4.8 mEQ/L      Comment: Results obtained on plasma. Plasma Potassium values may be up to 0.4 mEQ/L less than serum values. The differences may be greater for patients with high platelet or white cell counts.  SLIGHT HEMOLYSIS, RESULT MAY BE INCREASED.        Chloride 102 mEQ/L      CO2 24 mEQ/L      BUN 18 mg/dL      Creatinine 0.9 mg/dL      Glucose 170 mg/dL      Calcium 8.6 mg/dL      AST (SGOT) 38 IU/L      Comment: SLIGHT HEMOLYSIS, RESULT MAY BE INCREASED.        ALT (SGPT) 23 IU/L      Comment: SLIGHT HEMOLYSIS, RESULT MAY BE INCREASED.        Alkaline Phosphatase 58 IU/L      Total Protein 7.4 g/dL      Comment: Test performed on plasma which typically contains approximately 0.4 g/dL more protein than serum.        Albumin 4.1 g/dL      Bilirubin, Total 0.7 mg/dL      Comment: SLIGHT HEMOLYSIS, RESULT MAY BE INCREASED.        eGFR >60.0 mL/min/1.73m*2      Anion Gap 9 mEQ/L           Imaging Results          X-RAY CHEST 1 VIEW (Final result)  Result time 04/09/21 18:18:43    Final result                 Impression:    IMPRESSION: Multilobar pneumonia, consistent with the provided history of Covid  19.                 Narrative:    CLINICAL HISTORY: Covid 19 positive.    COMMENT: Upright portable view of the chest was obtained at 1808 hours. No  similar prior studies are available for comparison.    Patchy airspace opacities are noted in the  lungs bilaterally, right greater than  left.  There is no pleural effusion or pneumothorax.  The heart is top normal in  size.  There are multilevel thoracic spine degenerative changes and there are  right greater than left shoulder joint degenerative changes.                                ECG 12 lead   ED Interpretation   Reviewed by Dr. Wells: NSR, rate 91. No ST elevation, no T wave abnormalities, normal QTc                 Scoring tools                               ED Course & MDM   MDM / ED COURSE and CLINICAL IMPRESSIONS     MDM    ED Course as of Apr 09 2004 Fri Apr 09, 2021   1910 Pt pulse ox continuously observed, now 86% at rest. Oxygen and decadron ordered     [KM]   1930 Case discussed and results with Dr. Wells. Made aware of pt and plan for admit     [KM]   1937 Case was discussed with hospitalist, Dr. Norris.  Reviewed patient's presentation, ED course, and relevant data.  Hospitalist accepts patient on their service and will see / admit pt.     [KM]      ED Course User Index  [KM] Dulce Granado PA C         Clinical Impressions as of Apr 09 2004   Pneumonia due to COVID-19 virus   Hypoxia            Dulce Granado PA C  04/09/21 2004

## 2021-04-10 PROBLEM — A41.9 SEPSIS (CMS/HCC): Status: ACTIVE | Noted: 2021-04-10

## 2021-04-10 PROBLEM — J96.01 ACUTE RESPIRATORY FAILURE WITH HYPOXIA (CMS/HCC): Status: ACTIVE | Noted: 2021-04-10

## 2021-04-10 LAB
ANION GAP SERPL CALC-SCNC: 12 MEQ/L (ref 3–15)
ATRIAL RATE: 91
BUN SERPL-MCNC: 19 MG/DL (ref 8–20)
CALCIUM SERPL-MCNC: 8.3 MG/DL (ref 8.9–10.3)
CHLORIDE SERPL-SCNC: 104 MEQ/L (ref 98–109)
CO2 SERPL-SCNC: 21 MEQ/L (ref 22–32)
CREAT SERPL-MCNC: 0.7 MG/DL (ref 0.6–1.1)
ERYTHROCYTE [DISTWIDTH] IN BLOOD BY AUTOMATED COUNT: 12.7 % (ref 11.7–14.4)
EST. AVERAGE GLUCOSE BLD GHB EST-MCNC: 197 MG/DL
GFR SERPL CREATININE-BSD FRML MDRD: >60 ML/MIN/1.73M*2
GLUCOSE BLD-MCNC: 222 MG/DL (ref 70–99)
GLUCOSE BLD-MCNC: 255 MG/DL (ref 70–99)
GLUCOSE BLD-MCNC: 266 MG/DL (ref 70–99)
GLUCOSE BLD-MCNC: 268 MG/DL (ref 70–99)
GLUCOSE SERPL-MCNC: 320 MG/DL (ref 70–99)
HBA1C MFR BLD HPLC: 8.5 %
HCT VFR BLDCO AUTO: 41 % (ref 35–45)
HGB BLD-MCNC: 13.7 G/DL (ref 11.8–15.7)
LYMPHOCYTES # BLD: 0.56 K/UL (ref 1.2–3.5)
LYMPHOCYTES NFR BLD: 19 %
MCH RBC QN AUTO: 32 PG (ref 28–33.2)
MCHC RBC AUTO-ENTMCNC: 33.4 G/DL (ref 32.2–35.5)
MCV RBC AUTO: 95.8 FL (ref 83–98)
MONOCYTES # BLD: 0.15 K/UL (ref 0.28–0.8)
MONOCYTES NFR BLD: 5 %
NEUTS BAND # BLD: 0.56 K/UL (ref 0–0.53)
NEUTS BAND # BLD: 1.59 K/UL (ref 1.7–7)
NEUTS BAND NFR BLD: 19 %
NEUTS SEG NFR BLD: 54 %
P AXIS: 47
PDW BLD AUTO: 10.7 FL (ref 9.4–12.3)
PLATELET # BLD AUTO: 113 K/UL (ref 150–369)
PLATELET # BLD EST: ABNORMAL 10*3/UL
POCT TEST: ABNORMAL
POTASSIUM SERPL-SCNC: 4.1 MEQ/L (ref 3.6–5.1)
PR INTERVAL: 154
QRS DURATION: 92
QT INTERVAL: 352
QTC CALCULATION(BAZETT): 432
R AXIS: 15
RBC # BLD AUTO: 4.28 M/UL (ref 3.93–5.22)
SODIUM SERPL-SCNC: 137 MEQ/L (ref 136–144)
T WAVE AXIS: 52
TOXIC GRANULES BLD QL SMEAR: SLIGHT
VARIANT LYMPHS # BLD MANUAL: 0.09 K/UL
VARIANT LYMPHS NFR BLD: 3 %
VENTRICULAR RATE: 91
WBC # BLD AUTO: 2.95 K/UL (ref 3.8–10.5)

## 2021-04-10 PROCEDURE — 83036 HEMOGLOBIN GLYCOSYLATED A1C: CPT | Performed by: HOSPITALIST

## 2021-04-10 PROCEDURE — 80048 BASIC METABOLIC PNL TOTAL CA: CPT | Performed by: HOSPITALIST

## 2021-04-10 PROCEDURE — 99233 SBSQ HOSP IP/OBS HIGH 50: CPT | Performed by: INTERNAL MEDICINE

## 2021-04-10 PROCEDURE — 25000000 HC PHARMACY GENERAL: Performed by: INTERNAL MEDICINE

## 2021-04-10 PROCEDURE — 63600000 HC DRUGS/DETAIL CODE: Performed by: HOSPITALIST

## 2021-04-10 PROCEDURE — 93010 ELECTROCARDIOGRAM REPORT: CPT | Mod: CR | Performed by: INTERNAL MEDICINE

## 2021-04-10 PROCEDURE — 99223 1ST HOSP IP/OBS HIGH 75: CPT | Performed by: HOSPITALIST

## 2021-04-10 PROCEDURE — 25800000 HC PHARMACY IV SOLUTIONS: Performed by: INTERNAL MEDICINE

## 2021-04-10 PROCEDURE — 63700000 HC SELF-ADMINISTRABLE DRUG: Performed by: INTERNAL MEDICINE

## 2021-04-10 PROCEDURE — 85025 COMPLETE CBC W/AUTO DIFF WBC: CPT | Performed by: HOSPITALIST

## 2021-04-10 PROCEDURE — 20600000 HC ROOM AND CARE INTERMEDIATE/TELEMETRY

## 2021-04-10 PROCEDURE — 63700000 HC SELF-ADMINISTRABLE DRUG: Performed by: HOSPITALIST

## 2021-04-10 RX ORDER — ENOXAPARIN SODIUM 100 MG/ML
50 INJECTION SUBCUTANEOUS
Status: DISCONTINUED | OUTPATIENT
Start: 2021-04-10 | End: 2021-04-17 | Stop reason: HOSPADM

## 2021-04-10 RX ORDER — BUTALBITAL, ACETAMINOPHEN AND CAFFEINE 50; 325; 40 MG/1; MG/1; MG/1
1 TABLET ORAL EVERY 6 HOURS PRN
Status: DISCONTINUED | OUTPATIENT
Start: 2021-04-10 | End: 2021-04-17 | Stop reason: HOSPADM

## 2021-04-10 RX ORDER — GUAIFENESIN 600 MG/1
600 TABLET, EXTENDED RELEASE ORAL 2 TIMES DAILY
Status: DISCONTINUED | OUTPATIENT
Start: 2021-04-10 | End: 2021-04-17 | Stop reason: HOSPADM

## 2021-04-10 RX ORDER — CHOLECALCIFEROL (VITAMIN D3) 25 MCG
2000 TABLET ORAL
Status: DISCONTINUED | OUTPATIENT
Start: 2021-04-10 | End: 2021-04-17 | Stop reason: HOSPADM

## 2021-04-10 RX ORDER — ASCORBIC ACID 500 MG
500 TABLET ORAL 2 TIMES DAILY WITH MEALS
Status: DISCONTINUED | OUTPATIENT
Start: 2021-04-10 | End: 2021-04-17 | Stop reason: HOSPADM

## 2021-04-10 RX ADMIN — BUTALBITAL, ACETAMINOPHEN AND CAFFEINE 1 TABLET: 50; 325; 40 TABLET ORAL at 10:05

## 2021-04-10 RX ADMIN — INSULIN ASPART 6 UNITS: 100 INJECTION, SOLUTION INTRAVENOUS; SUBCUTANEOUS at 09:50

## 2021-04-10 RX ADMIN — CITALOPRAM HYDROBROMIDE 5 MG: 10 TABLET ORAL at 08:44

## 2021-04-10 RX ADMIN — OXYCODONE HYDROCHLORIDE AND ACETAMINOPHEN 500 MG: 500 TABLET ORAL at 08:44

## 2021-04-10 RX ADMIN — PREGABALIN 150 MG: 75 CAPSULE ORAL at 08:44

## 2021-04-10 RX ADMIN — REMDESIVIR 200 MG: 100 INJECTION, POWDER, LYOPHILIZED, FOR SOLUTION INTRAVENOUS at 16:12

## 2021-04-10 RX ADMIN — OXYCODONE HYDROCHLORIDE AND ACETAMINOPHEN 500 MG: 500 TABLET ORAL at 16:11

## 2021-04-10 RX ADMIN — ACETAMINOPHEN 650 MG: 325 TABLET ORAL at 06:03

## 2021-04-10 RX ADMIN — ACETAMINOPHEN 325 MG: 325 TABLET ORAL at 21:22

## 2021-04-10 RX ADMIN — INSULIN ASPART 6 UNITS: 100 INJECTION, SOLUTION INTRAVENOUS; SUBCUTANEOUS at 16:58

## 2021-04-10 RX ADMIN — GUAIFENESIN 600 MG: 600 TABLET, EXTENDED RELEASE ORAL at 10:05

## 2021-04-10 RX ADMIN — PREGABALIN 150 MG: 75 CAPSULE ORAL at 21:00

## 2021-04-10 RX ADMIN — INSULIN ASPART 6 UNITS: 100 INJECTION, SOLUTION INTRAVENOUS; SUBCUTANEOUS at 22:40

## 2021-04-10 RX ADMIN — INSULIN ASPART 6 UNITS: 100 INJECTION, SOLUTION INTRAVENOUS; SUBCUTANEOUS at 12:59

## 2021-04-10 RX ADMIN — GUAIFENESIN 600 MG: 600 TABLET, EXTENDED RELEASE ORAL at 21:00

## 2021-04-10 RX ADMIN — Medication 2000 UNITS: at 16:11

## 2021-04-10 RX ADMIN — BUTALBITAL, ACETAMINOPHEN AND CAFFEINE 1 TABLET: 50; 325; 40 TABLET ORAL at 21:10

## 2021-04-10 RX ADMIN — ENOXAPARIN SODIUM 50 MG: 60 INJECTION SUBCUTANEOUS at 17:10

## 2021-04-10 RX ADMIN — DEXAMETHASONE SODIUM PHOSPHATE 6 MG: 4 INJECTION, SOLUTION INTRA-ARTICULAR; INTRALESIONAL; INTRAMUSCULAR; INTRAVENOUS; SOFT TISSUE at 08:48

## 2021-04-10 RX ADMIN — ENOXAPARIN SODIUM 50 MG: 60 INJECTION SUBCUTANEOUS at 06:03

## 2021-04-10 RX ADMIN — BUTALBITAL, ACETAMINOPHEN AND CAFFEINE 1 TABLET: 50; 325; 40 TABLET ORAL at 16:11

## 2021-04-10 RX ADMIN — ATORVASTATIN CALCIUM 20 MG: 20 TABLET, FILM COATED ORAL at 21:10

## 2021-04-10 ASSESSMENT — ENCOUNTER SYMPTOMS
WHEEZING: 0
SHORTNESS OF BREATH: 1
FATIGUE: 1
PALPITATIONS: 0
COUGH: 1
STRIDOR: 0

## 2021-04-10 NOTE — ED ATTESTATION NOTE
I have personally seen and examined the patient.  I reviewed and agree with physician assistant / nurse practitioner’s assessment and plan of care, with the following exceptions:   This is a 60-year-old female, recently diagnosed with Covid, who presents ER with increased shortness of breath, fever, chills.  Patient was seen and evaluated by Magda Tesfaye physician assistant.  Patient's oxygen sats noted to be less than 92%.      Physical Exam  Constitutional:       General: She is awake. She is not in acute distress.     Appearance: She is well-developed and well-groomed. She is not ill-appearing, toxic-appearing or diaphoretic.   Cardiovascular:      Rate and Rhythm: Normal rate and regular rhythm.      Heart sounds: Normal heart sounds.   Pulmonary:      Effort: No tachypnea, bradypnea, accessory muscle usage, prolonged expiration, respiratory distress or retractions.      Breath sounds: Normal breath sounds and air entry.   Abdominal:      General: Abdomen is flat.      Palpations: Abdomen is soft.      Tenderness: There is no abdominal tenderness.   Neurological:      Mental Status: She is alert, oriented to person, place, and time and easily aroused.      GCS: GCS eye subscore is 4. GCS verbal subscore is 5. GCS motor subscore is 6.             Assessment, and plan of care of Maylin Stiles is as follows:  Patient hypoxic, positive for Covid.  Patient to be admitted.  IV steroids          I performed or was present for the key/critical portions of the following:    Procedures         Gloria Wells DO  04/09/21 2031

## 2021-04-10 NOTE — H&P
Hospital Medicine Service -  History & Physical        CHIEF COMPLAINT   Cough, myalgias, fever, fatigue.     HISTORY OF PRESENT ILLNESS      Maylin Stiles is a 60 y.o. female with a past medical history of DM type 2, depression with anxiety, fibromyalgia, [facility]. Her  was recently found to be positive for COVID-19. In the ER tonight, her COVID-19 test is POSITIVE. Her CXR shows B/L small infiltrates. LDH and CRP are elevated. During my interview, Mrs. Stiles is AAO x 3, able to make fair conversation and looking in NAD while at rest. She denies orthopnea, chest pain, nausea or vomiting.    PAST MEDICAL AND SURGICAL HISTORY      Past Medical History:   Diagnosis Date   • Depression with anxiety    • Diabetes mellitus (CMS/HCC)    • Fibromyalgia        Past Surgical History:   Procedure Laterality Date   • OVARIAN CYST REMOVAL         PCP: Jorje Grande, DO    MEDICATIONS      Prior to Admission medications    Medication Sig Start Date End Date Taking? Authorizing Provider   atorvastatin (LIPITOR) 20 mg tablet Take 20 mg by mouth nightly. 4/20/18   Agustin Loya MD   citalopram (CELEXA) 10 mg tablet Take 5 mg by mouth once daily. 4/25/18   Agustin Loya MD   glimepiride (AMARYL) 2 mg tablet Take 2 mg by mouth every morning. 6/7/19   Agustin Loya MD   LYRICA 150 mg capsule TAKE 1 CAPSULE BY MOUTH TWO TIMES DAILY 5/14/18   Agustin Loya MD   meloxicam (MOBIC) 15 mg tablet TAKE 1 TABLET BY MOUTH EVERY DAY WITH FOOD AS NEEDED FOR BACK 5/14/18   Agustin Loya MD   metFORMIN XR (GLUCOPHATE-XR) 500 mg 24 hr tablet TAKE 4 TABLETS BY MOUTH EVERY DAY WITH THE EVENING MEAL 5/3/18   Agustin Loya MD       ALLERGIES      Patient has no known allergies.    FAMILY HISTORY      Family History   Problem Relation Age of Onset   • Diabetes Biological Father    • Breast cancer Biological Mother    • Diabetes Biological  Mother        SOCIAL HISTORY      Social History     Socioeconomic History   • Marital status:      Spouse name: None   • Number of children: None   • Years of education: None   • Highest education level: None   Occupational History   • None   Social Needs   • Financial resource strain: None   • Food insecurity     Worry: None     Inability: None   • Transportation needs     Medical: None     Non-medical: None   Tobacco Use   • Smoking status: Former Smoker     Packs/day: 1.00     Years: 10.00     Pack years: 10.00     Types: Cigarettes     Start date:      Quit date:      Years since quittin.2   • Smokeless tobacco: Former User   Substance and Sexual Activity   • Alcohol use: Not Currently   • Drug use: No   • Sexual activity: Yes     Partners: Male     Birth control/protection: None   Lifestyle   • Physical activity     Days per week: None     Minutes per session: None   • Stress: None   Relationships   • Social connections     Talks on phone: None     Gets together: None     Attends Sikhism service: None     Active member of club or organization: None     Attends meetings of clubs or organizations: None     Relationship status: None   • Intimate partner violence     Fear of current or ex partner: None     Emotionally abused: None     Physically abused: None     Forced sexual activity: None   Other Topics Concern   • None   Social History Narrative    Lives with her .       REVIEW OF SYSTEMS      All other systems reviewed and negative except as noted in HPI    PHYSICAL EXAMINATION      Temp:  [37 °C (98.6 °F)-38.8 °C (101.8 °F)] 37 °C (98.6 °F)  Heart Rate:  [81-97] 81  Resp:  [19-20] 19  BP: (111-146)/(68-77) 111/68  Body mass index is 36.19 kg/m².    Physical Exam     HEENT: NCAT, PERRLA,  EOMI, supple neck, clear oropharynx.  Skin: intact, normal complexion for race.  Chest: good expansion with inspiration B/L, symmetric.  Lungs: clear to auscultation B/L.  CV : S1, S2, RRR, no  pathologic sounds.  Abdomen: soft, Nt, not distended, no organomegaly, +BS in all four quadrants.  Neuro: AAO x 3, no gross focal deficit.  Extremities: - ECC, good ROM in all joints B/L.    LABS / IMAGING / EKG        Labs  I have reviewed the patient's pertinent labs.  Significant abnormals are , CRP 74..    Imaging  I have independently reviewed the pertinent imaging from the last 24 hrs.    No results found for: COVID19    ECG/Telemetry  I have independently reviewed the ECG. No significant findings.    ASSESSMENT AND PLAN           * Pneumonia due to COVID-19 virus  Assessment & Plan  With associated hypoxia, the patient saturation is 95% on 2L/min supplemental oxygen.  Found to be COVID-19 positive tonight, her  already has it.  Will admit to telemetry bed.  IV Decadron, prn Albuterol.  Will check LDH and CRP levels.  Pulmonary and ID are consulted.    Fibromyalgia  Assessment & Plan  Continue Lyrica.    Type 2 diabetes mellitus, without long-term current use of insulin (CMS/[facility])  Assessment & Plan  On Metformin and Amaryl, will continue.  Monitor BG levels and use ISS for coverage.  Will check Hgb A1c if not done in the last six months.         VTE Assessment: Padua VTE Score: 2  VTE Prophylaxis: Lovenox.  Code Status: Full Code  Palliative Care Screening Score: 0   Estimated Discharge Date: 4/12/2021  Disposition Planning: Home.     Robby Norris MD  4/10/2021

## 2021-04-10 NOTE — ASSESSMENT & PLAN NOTE
HbA1c of 8.5  Uncontrolled BG while on IV steroids   Insulin adjusted again on 4/14  Holding oral med for now

## 2021-04-10 NOTE — CONSULTS
Infectious Disease Consult Note    Patient Name: Maylin Stiles  MR#: [id number]  : 1960  Admission Date: 2021  Consult Date: 04/10/21 9:24 AM   Consultant: Abby Bolton MD    Reason for Consult: 61 y/o w/f with DM tyep 2 and obesity, admitted for COVID-19 pneumonia with hypoxia  Referring Provider:       History of Present Illness     Maylin Stiles is a 60 y.o. female who was admitted on 2021.  60-year-old female presented to the emergency room for evaluation of cough, myalgias, fever, fatigue.  Patient's  was recently diagnosed with COVID-19 and patient tested positive for COVID-19 as an outpatient on 2021 per review of admit notes with radiographic evidence of Covid pneumonia  Febrile to 101.8 in the emergency room with documented pulse ox of 86% at rest at which point oxygen supplementation and Decadron was initiated and patient admitted to the hospital for further management  On 4 L oxygen supplementation by nasal cannula this morning  Her  who was also admitted to the hospital and received remdesivir is being discharged today, their daughter at home also had Covid but has recovered and had mild symptoms    Outside records were reviewed.    Allergies: No Known Allergies    Medical History:   Past Medical History:   Diagnosis Date   • Depression with anxiety    • Diabetes mellitus (CMS/HCC)    • Fibromyalgia        Surgical History:   Past Surgical History:   Procedure Laterality Date   • OVARIAN CYST REMOVAL         Social History:   Social History     Socioeconomic History   • Marital status:      Spouse name: None   • Number of children: None   • Years of education: None   • Highest education level: None   Occupational History   • None   Social Needs   • Financial resource strain: None   • Food insecurity     Worry: None     Inability: None   • Transportation needs     Medical: None     Non-medical: None   Tobacco Use   • Smoking status: Former  Smoker     Packs/day: 1.00     Years: 10.00     Pack years: 10.00     Types: Cigarettes     Start date:      Quit date:      Years since quittin.2   • Smokeless tobacco: Former User   Substance and Sexual Activity   • Alcohol use: Not Currently   • Drug use: No   • Sexual activity: Yes     Partners: Male     Birth control/protection: None   Lifestyle   • Physical activity     Days per week: None     Minutes per session: None   • Stress: None   Relationships   • Social connections     Talks on phone: None     Gets together: None     Attends Pentecostalism service: None     Active member of club or organization: None     Attends meetings of clubs or organizations: None     Relationship status: None   • Intimate partner violence     Fear of current or ex partner: None     Emotionally abused: None     Physically abused: None     Forced sexual activity: None   Other Topics Concern   • None   Social History Narrative    Lives with her .          Travel Exposure:   Travel and Exposure Screening      Most Recent Value   Travel Screening   Overnight hospitalization outside the [place] in the last year?  No Filed On: 2021 7084   Exposure Screening   Symptoms          Animal Exposure: none    Other Exposure: none    Family History:   Family History   Problem Relation Age of Onset   • Diabetes Biological Father    • Breast cancer Biological Mother    • Diabetes Biological Mother        Review of Systems    Constitutional: positive for fevers, fatigue and chills  Eyes: negative for redness  Ears, nose, mouth, throat, and face: negative for sore throat  Respiratory: positive for cough and dyspnea on exertion  Cardiovascular: negative for chest pressure/discomfort  Gastrointestinal: negative for abdominal pain  Genitourinary:negative for dysuria  Integument/breast: negative for skin color change  Hematologic/lymphatic: negative for bleeding  Musculoskeletal:negative for muscle weakness  Neurological: negative for  "seizures    Medications:    Current IP Meds (From admission, onward)        Frequency     cholecalciferol (vitamin D3) tablet 2,000 Units      Daily with dinner     butalbital-acetaminophen-caff (FIORICET, ESGIC) per tablet 1 tablet      Every 6 hours PRN     dexamethasone (DECADRON) injection 6 mg      Daily     citalopram (celeXA) tablet 5 mg      Daily     glimepiride (AMARYL) tablet 2 mg  Status:  Discontinued      Every morning     ascorbic acid (VITAMIN C) tablet 500 mg      2 times daily with meals     enoxaparin (LOVENOX) syringe 50 mg      Every 12 hours (6a, 6p)     atorvastatin (LIPITOR) tablet 20 mg      Nightly     insulin aspart U-100 (NovoLOG) pen 6-10 Units      4 times daily with meals and nightly     acetaminophen (TYLENOL) tablet 650 mg      Every 4 hours PRN     albuterol HFA (VENTOLIN HFA) 90 mcg/actuation inhaler 2 puff      Every 6 hours PRN     pregabalin (LYRICA) capsule 150 mg      2 times daily     dexamethasone (DECADRON) injection 6 mg      Once     albuterol HFA (VENTOLIN HFA) 90 mcg/actuation inhaler 2 puff      Once     sodium chloride 0.9 % bolus 1,000 mL      Once     acetaminophen (TYLENOL) tablet 650 mg      Once     ibuprofen (MOTRIN) tablet 600 mg      Once                Objective     Vital Signs:    Patient Vitals for the past 72 hrs:   BP Temp Temp src Pulse Resp SpO2 Height Weight   04/10/21 0800 -- -- -- 75 -- -- -- --   04/10/21 0400 (!) 103/51 36.9 °C (98.5 °F) Oral 85 18 95 % -- --   04/10/21 0346 -- -- -- -- -- 93 % -- --   04/10/21 0110 120/73 36.8 °C (98.2 °F) Oral 84 18 95 % 1.676 m (5' 6\") 99.8 kg (220 lb)   04/09/21 2235 111/68 37 °C (98.6 °F) -- 81 19 93 % -- --   04/09/21 2111 118/77 -- -- 92 20 93 % -- --   04/09/21 1925 -- (!) 38.2 °C (100.8 °F) Oral -- -- 93 % -- --   04/09/21 1910 -- -- -- -- -- (!) 86 % -- --   04/09/21 1905 140/68 -- -- 97 20 92 % -- --   04/09/21 1707 (!) 146/72 (!) 38.8 °C (101.8 °F) Oral 96 20 95 % 1.676 m (5' 6\") 102 kg (224 lb 3.2 oz) "       Temp (72hrs), Av.6 °C (99.6 °F), Min:36.8 °C (98.2 °F), Max:38.8 °C (101.8 °F)      Physical Exam:    Awake, alert, laying in bed, does not appear to be in any acute distress, ill-appearing, weak, anicteric, EOMI, nonlabored respiration, on 4 L oxygen supplementation by nasal cannula, oriented to place person, cooperative    Lines, Drains, Airways, Wounds:  Peripheral IV 21 Right Forearm (Active)   Number of days: 1       Labs:    CBC Results       04/10/21 04/09/21                       0445 1839          WBC 2.95 3.68          RBC 4.28 4.34          HGB 13.7 13.8          HCT 41.0 41.2          MCV 95.8 94.9          MCH 32.0 31.8          MCHC 33.4 33.5           149                     BMP Results       04/10/21 04/09/21                       0445 1839           135          K 4.1 4.8          Cl 104 102          CO2 21 24          Glucose 320 170          BUN 19 18          Creatinine 0.7 0.9          Calcium 8.3 8.6          Anion Gap 12 9          EGFR >60.0 >60.0          Comment for K at 1839 on 21: Results obtained on plasma. Plasma Potassium values may be up to 0.4 mEQ/L less than serum values. The differences may be greater for patients with high platelet or white cell counts.  SLIGHT HEMOLYSIS, RESULT MAY BE INCREASED.      PT/PTT Results       21           PT 12.8           INR 1.0           PTT 36           Comment for INR at  on 21: INR has no defined significance when PT is within Reference Range.    Comment for PTT at  on 21: The Standard Therapeutic Range for Heparin is 68 to 101 seconds.      UA Results     No lab values to display.      Lactate Results     No lab values to display.          Microbiology Results     ** No results found for the last 720 hours. **          Pathology Results     ** No results found for the last 720 hours. **          Echo:          Imaging:    Radiology Imaging   XR CHEST 1 VW     Narrative CLINICAL HISTORY: Covid 19 positive.    COMMENT: Upright portable view of the chest was obtained at 1808 hours. No  similar prior studies are available for comparison.    Patchy airspace opacities are noted in the lungs bilaterally, right greater than  left.  There is no pleural effusion or pneumothorax.  The heart is top normal in  size.  There are multilevel thoracic spine degenerative changes and there are  right greater than left shoulder joint degenerative changes.      Impression IMPRESSION: Multilobar pneumonia, consistent with the provided history of Covid  19.           Assessment and plan    COVID-19  -Associated COVID-19 pneumonia  -Associated acute hypoxic respiratory failure, on 4 L oxygen supplementation by nasal cannula  -  -CRP 75.1 on presentation, 74 this morning  -Leukopenia, WBC 2.95  -No cultures to review  -Isolation precautions per protocol  -Encourage awake modified proning and incentive spirometry as tolerated  -Creatinine 0.7, EGFR over 60  -AST/ALT 38/23  -Decadron per primary team/pulmonary  -Remdesivir, patient aware of the medication and protocol as her  who is being discharged from the hospital today received it as well and agreeable to having it initiated  -Daily CBC CMP while on remdesivir    NOTE: Some or all of the note above was created with the use of dictation software, please note this dictation software can have anomalies in its ability to transcribe. Please contact me directly for anything that seems abnormal for clarification.

## 2021-04-10 NOTE — CONSULTS
Pulmonology Consult Note    Reason For Consult: Covid related pneumonia    HPI: Patient is a 60-year-old female admitted with nonproductive cough progressive shortness of breath and noted to be hypoxemic.  She required O2 at 4 L.  She tested positive for COVID-19 infection, also of note her  is being discharged today with the same diagnosis and her daughter has Covid infection with mild symptoms.  She denies any previous history of lung disease.  She is noted to be diabetic and obese. Patient was started on Decadron and remdesivir.      Past Medical History:  Past Medical History:   Diagnosis Date   • Depression with anxiety    • Diabetes mellitus (CMS/HCC)    • Fibromyalgia        Past Surgical History:  Past Surgical History:   Procedure Laterality Date   • OVARIAN CYST REMOVAL         Allergies:   Patient has no known allergies.    Medications:  Current Facility-Administered Medications   Medication Dose Route Frequency Provider Last Rate Last Admin   • acetaminophen (TYLENOL) tablet 650 mg  650 mg oral q4h PRN Robby Norris MD   650 mg at 04/10/21 0603   • albuterol HFA (VENTOLIN HFA) 90 mcg/actuation inhaler 2 puff  2 puff inhalation q6h PRN Robby Norris MD       • ascorbic acid (VITAMIN C) tablet 500 mg  500 mg oral BID with meals Fahad Mcmahan MD   500 mg at 04/10/21 0844   • atorvastatin (LIPITOR) tablet 20 mg  20 mg oral Nightly Robby Norris MD   20 mg at 04/09/21 2236   • butalbital-acetaminophen-caff (FIORICET, ESGIC) per tablet 1 tablet  1 tablet oral q6h PRN Fahad Mcmahan MD   1 tablet at 04/10/21 1005   • cholecalciferol (vitamin D3) tablet 2,000 Units  2,000 Units oral Daily with dinner Fahad Mcmahan MD       • citalopram (celeXA) tablet 5 mg  5 mg oral Daily Robby Norris MD   5 mg at 04/10/21 0844   • dexamethasone (DECADRON) injection 6 mg  6 mg intravenous Daily Robby Norris MD   6 mg at 04/10/21 0848   • enoxaparin (LOVENOX) syringe 50 mg  50 mg  subcutaneous q12h (6a, 6p) Robby Norris MD   50 mg at 04/10/21 0603   • guaiFENesin (MUCINEX) 12 hr ER tablet 600 mg  600 mg oral BID Fahad Mcmahan MD   600 mg at 04/10/21 1005   • insulin aspart U-100 (NovoLOG) pen 6-10 Units  6-10 Units subcutaneous With meals & nightly Robby Norris MD   6 Units at 04/10/21 1259   • pregabalin (LYRICA) capsule 150 mg  150 mg oral BID Robby Norris MD   150 mg at 04/10/21 0844   • remdesivir (VEKLURY) 200 mg in sodium chloride 0.9 % 250 mL IVPB  200 mg intravenous Once Abby Bolton MD        Followed by   • [START ON 2021] remdesivir (VEKLURY) 100 mg in sodium chloride 0.9 % 250 mL IVPB  100 mg intravenous q24h INT Abby Bolton MD           Social History:  Social History     Socioeconomic History   • Marital status:      Spouse name: None   • Number of children: None   • Years of education: None   • Highest education level: None   Occupational History   • None   Social Needs   • Financial resource strain: None   • Food insecurity     Worry: None     Inability: None   • Transportation needs     Medical: None     Non-medical: None   Tobacco Use   • Smoking status: Former Smoker     Packs/day: 1.00     Years: 10.00     Pack years: 10.00     Types: Cigarettes     Start date:      Quit date:      Years since quittin.2   • Smokeless tobacco: Former User   Substance and Sexual Activity   • Alcohol use: Not Currently   • Drug use: No   • Sexual activity: Yes     Partners: Male     Birth control/protection: None   Lifestyle   • Physical activity     Days per week: None     Minutes per session: None   • Stress: None   Relationships   • Social connections     Talks on phone: None     Gets together: None     Attends Jainism service: None     Active member of club or organization: None     Attends meetings of clubs or organizations: None     Relationship status: None   • Intimate partner violence     Fear of current or ex partner: None      "Emotionally abused: None     Physically abused: None     Forced sexual activity: None   Other Topics Concern   • None   Social History Narrative    Lives with her .       Family History:  Family History   Problem Relation Age of Onset   • Diabetes Biological Father    • Breast cancer Biological Mother    • Diabetes Biological Mother        Review of Systems:  Review of Systems   Constitutional: Positive for fatigue.   Respiratory: Positive for cough and shortness of breath. Negative for wheezing and stridor.    Cardiovascular: Negative for chest pain, palpitations and leg swelling.   All other systems reviewed and are negative.      Objective     Vital Signs for the last 24 hours:  Visit Vitals  /74 (BP Location: Left upper arm, Patient Position: Lying)   Pulse 98   Temp (!) 38.3 °C (100.9 °F) (Oral)   Resp 20   Ht 1.676 m (5' 6\")   Wt 99.8 kg (220 lb)   LMP  (LMP Unknown)   SpO2 94%   BMI 35.51 kg/m²     PF Readings from Last 3 Encounters:   No data found for PF       Oxygen:  Oxygen Therapy: Supplemental oxygen  O2 Delivery Method: Nasal cannula     O2 Flow Rate (L/min): 4 L/min     Physical Exam:  Physical Exam  Vitals signs and nursing note reviewed.   Constitutional:       Appearance: Normal appearance.   HENT:      Nose: No congestion.   Eyes:      General: No scleral icterus.  Cardiovascular:      Rate and Rhythm: Regular rhythm.      Heart sounds: No murmur.   Pulmonary:      Comments: Diminished breath sounds  Abdominal:      Palpations: Abdomen is soft.   Musculoskeletal:         General: No swelling.   Skin:     General: Skin is warm.   Neurological:      Mental Status: She is alert and oriented to person, place, and time.   Psychiatric:         Behavior: Behavior normal.         Labs:        Lab Results   Component Value Date    HGBA1C 8.5 (H) 04/10/2021       Input/Ouput in Last 24 hours:    Intake/Output Summary (Last 24 hours) at 4/10/2021 1418  Last data filed at 4/9/2021 1953  Gross per " 24 hour   Intake 1000 ml   Output --   Net 1000 ml       Imaging/Radiology: Bibasilar infiltrates noted on plain chest x-ray      IMPRESSION AND PLAN :    1.  Acute hypoxemic respiratory failure, requiring O2 supplementation, secondary to Covid pneumonitis  -O2 at 4 L  -Proning and positional changes was stressed to the patient as well as I-S    2.  Covid pneumonitis with bibasilar interstitial infiltrates  -Decadron 6 mg IV  -Remdesivir as per infectious disease  -Follow-up of inflammatory markers

## 2021-04-10 NOTE — CONSULTS
Brief Nutrition Assessment Reason for consult:  covid screen     Nutrition Interventions/Recommendations:   1. Continue with diet as tolerated - 1800 DM, cardiac   2. Weekly weights  3. Consider daily multivitamin     Monitor:  1. % PO intake  2. Diet tolerance  3. Weight changes  4. Labs-replete prn  5. Skin integrity  6. Bowel function    Goals:         1. To consume and tolerate >/= 75% of estimated needs via goal diet and supplements      Clinical Course: Patient is a 60 y.o. female who was admitted on 4/9/2021 with a diagnosis of Hypoxia [R09.02]  Pneumonia due to COVID-19 virus [U07.1, J12.82].     Pt with covid, hx DM, on decadron. . Doesn't check BG regularly at home. Appetite less than normal, 100% PO per nsg. No nausea, vomiting, diarrhea.   No weight changes.          Dietary Orders   (From admission, onward)             Start     Ordered    04/10/21 0938  Adult Diet Regular; Consistent Carbohydrate 1800; Cardiac (Low Sodium/Low Fat), No Concentrated Sweets; RD/LDN may adjust order  Diet effective now     Question Answer Comment   Diet Texture Regular    Diabetic Restrictions: Consistent Carbohydrate 1800    Other Restriction(s): Cardiac (Low Sodium/Low Fat)    Other Restriction(s): No Concentrated Sweets    Delegation of Authority. Diet orders written by PA/Ariela may not be adjusted by RD/LDNs. RD/LDN may adjust order        04/10/21 0937              Weights (last 7 days)     Date/Time   Weight    04/10/21 0110   99.8 kg (220 lb)    04/09/21 1707   102 kg (224 lb 3.2 oz)            Wt Readings from Last 3 Encounters:   04/10/21 99.8 kg (220 lb)   05/08/19 90.7 kg (200 lb)            MST Nutrition Screen Tool  Has patient lost weight without trying?: 0-->No  Has patient been eating poorly due to decreased appetite?: 0-->No  MST Nutrition Screen Score: 0    Nutrition/Diet History  Intake (%): 100%  Supplemental Drinks/Foods/Additives: (D, fish oul, Mg, Calcium, biotin, tumeric)    Physical  "Findings  Overall Physical Appearance: (covid, not observed. BMI obese)         Nutrition Order  Nutrition Order Review: meets nutritional requirements  Nutrition Order Comments: (cardiac, 1800 DM)    Anthropometrics  Height: 167.6 cm (5' 6\")      Admit Weight  Admit Weight: (220#)    Current Weight  Weight Method: Stated  Weight: 99.8 kg (220 lb)    Ideal Body Weight (IBW)  Ideal Body Weight (IBW) (kg): 59.58  % Ideal Body Weight: 167.5    Usual Body Weight (UBW)  Usual Body Weight: (220# per pt)  Body Mass Index (BMI)  BMI (Calculated): 35.5  BMI (kg/m2): 35.58  BMI Assessment: BMI 35-39.9: obesity grade II                        Labs/Procedures/Meds  Lab Results Reviewed: reviewed, pertinent  Lab Results Comments: ( on decadron)    Results from last 7 days   Lab Units 04/10/21  0445 04/09/21  1839   SODIUM mEQ/L 137 135*   POTASSIUM mEQ/L 4.1 4.8   CHLORIDE mEQ/L 104 102   CO2 mEQ/L 21* 24   BUN mg/dL 19 18   CREATININE mg/dL 0.7 0.9   GLUCOSE mg/dL 320* 170*   CALCIUM mg/dL 8.3* 8.6*      Results from last 7 days   Lab Units 04/09/21  1839   ALK PHOS IU/L 58   BILIRUBIN TOTAL mg/dL 0.7   ALBUMIN g/dL 4.1   ALT IU/L 23   AST IU/L 38          No results found for: HGBA1C  No results found for: [id number]  Lab Results   Component Value Date    CALCIUM 8.3 (L) 04/10/2021     Results from last 7 days   Lab Units 04/10/21  0445 04/09/21  1839   WBC K/uL 2.95* 3.68*   HEMOGLOBIN g/dL 13.7 13.8   HEMATOCRIT % 41.0 41.2   PLATELETS K/uL 113* 149*                           Medications  Pertinent Medications Reviewed: reviewed, pertinent  Pertinent Medications Comments: (decadron, CHERYL, novolog )  • ascorbic acid  500 mg oral BID with meals   • atorvastatin  20 mg oral Nightly   • cholecalciferol (vitamin D3)  2,000 Units oral Daily with dinner   • citalopram  5 mg oral Daily   • dexamethasone  6 mg intravenous Daily   • enoxaparin  50 mg subcutaneous q12h (6a, 6p)   • guaiFENesin  600 mg oral BID   • insulin aspart " U-100  6-10 Units subcutaneous With meals & nightly   • pregabalin  150 mg oral BID         Nutritional Needs Met?: Yes  Nutrition Status Classification: Mild nutritional compromise(PO less than normal, (3), covid )      Date: 04/10/21  Signature: Doris Plummer, RD, LDN

## 2021-04-10 NOTE — ASSESSMENT & PLAN NOTE
Acute respiratory failure with hypoxia, currently requiring 4 L of oxygen via nasal cannula to keep oxygen levels above 88.    This is secondary to COVID-19 pneumonia.  Continue pulmonary toileting, incentive spirometry, frequent position shifts.     Remdesivir initiated D # 5/5  D # 8/10  of IV dexamethasone   Continue 0.5mg/kg of Lovenox given vascular complications of Covid.  Started on Dulera for intractable cough   Increasing hypoxia today needing nonheated high flow at 13-15 liter with oxygen saturation 93-94 %   Continue to titrate oxygen to keep sat >92 %   Decadron 6 mg IV, 10-day course.or once hypoxia resolves    Started to wean off from oxygen on 4/14 - monitor   Home oxygen eval prior to discharge .

## 2021-04-10 NOTE — PLAN OF CARE
"  Problem: Adult Inpatient Plan of Care  Goal: Readiness for Transition of Care  Intervention: Mutually Develop Transition Plan  Flowsheets (Taken 4/10/2021 1408)  Anticipated Discharge Disposition: home without services  Equipment Needed After Discharge: none  Discharge Coordination/Progress: SW P/C to pt.'s room due to COVID precautions to complete the assessment and screening. JESSICA introduced self and Care Coordination Dept services. Pt verified name and .   ?  CURRENT LIVING SITUATION: Pt lives at home with her  and daughter. Pt.'s  and daughter both diagnosed with COVID and pt.'s  just D/C'd from hospital, today. Pt resides in a two-story home with a few steps to enter from the garage and steps to get up onto her second floor to her bedroom.   Prior level of functioning: Pt was able to care for herself prior to elizabeth COVID-19. When she became sick, she slept often and had \"horrible body aches\" that required a lot of tylenol.  Emergency Contact: Pt.'s , Caden ([phone])  Pharmacy: [facility] [place], [place] [address]  PCP: Dr. Grande  DME/OXYGEN: Pt has no O2 or DME but heard that he rhusband just had O2 delivered today for himself.   Home Care Services: Pt has had no HC services in the past.   : No  Employment: Pt does not work. She shared \"she is starting a new job soon.\"  Transportation: Pt.'s family is able to transport pt  Social Determinates of Health: Pt has access to food, clothing, housing, and basic needs.   Disposition: When medically ready for D/C, pt will return home.   ?  SW Care Coordination will continue to follow for dc planning purposes.     Assistive Device/Animal Currently Used at Home: none  Anticipated Changes Related to Illness: none  Transportation Concerns: car, none  Readmission Within the Last 30 Days: no previous admission in last 30 days  Patient/Family Anticipated Services at Transition: none  Patient/Family Anticipates Transition to:  • " home  • home with family  Transportation Anticipated: family or friend will provide  Concerns to be Addressed:  • no discharge needs identified  • denies needs/concerns at this time

## 2021-04-10 NOTE — ASSESSMENT & PLAN NOTE
Patient with sepsis, secondary to COVID-19 pneumonia.  Draw blood cultures.  Follow up on blood cultures.  ID following.

## 2021-04-10 NOTE — PROGRESS NOTES
Hospital Medicine Service -  Daily Progress Note       SUBJECTIVE   Interval History: Patient examined and interviewed at bedside.    Endorses cough, weakness and shortness of breath.    Denies chest pain, focal weakness, vision change, loose bowel movements, abdominal pain.     OBJECTIVE      Vital signs in last 24 hours:  Temp:  [36.8 °C (98.2 °F)-38.8 °C (101.8 °F)] 38.3 °C (100.9 °F)  Heart Rate:  [75-98] 98  Resp:  [18-20] 20  BP: (103-146)/(51-77) 137/74    Intake/Output Summary (Last 24 hours) at 4/10/2021 1426  Last data filed at 4/9/2021 1953  Gross per 24 hour   Intake 1000 ml   Output --   Net 1000 ml       PHYSICAL EXAMINATION      Physical Exam  Vitals signs and nursing note reviewed.   Constitutional:       Appearance: Normal appearance. She is normal weight.   HENT:      Head: Normocephalic and atraumatic.      Right Ear: External ear normal.      Left Ear: External ear normal.      Nose: Nose normal.      Mouth/Throat:      Mouth: Mucous membranes are moist.      Pharynx: Oropharynx is clear.   Eyes:      Extraocular Movements: Extraocular movements intact.      Conjunctiva/sclera: Conjunctivae normal.      Pupils: Pupils are equal, round, and reactive to light.   Neck:      Musculoskeletal: Normal range of motion and neck supple.   Cardiovascular:      Rate and Rhythm: Normal rate and regular rhythm.      Pulses: Normal pulses.      Heart sounds: Normal heart sounds.   Pulmonary:      Comments: Air entry reduced bilateral lung base.  Patient cannot talk in complete sentences, accessory muscles of respiration in use.  Abdominal:      General: Abdomen is flat. Bowel sounds are normal.      Palpations: Abdomen is soft.   Musculoskeletal: Normal range of motion.   Skin:     General: Skin is warm.      Capillary Refill: Capillary refill takes less than 2 seconds.   Neurological:      General: No focal deficit present.      Mental Status: She is alert and oriented to person, place, and time. Mental  status is at baseline.   Psychiatric:         Mood and Affect: Mood normal.         Behavior: Behavior normal.         Thought Content: Thought content normal.         Judgment: Judgment normal.            LINES, CATHETERS, DRAINS, AIRWAYS, AND WOUNDS   Lines, Drains, and Airways:  Wounds (agree with documentation and present on admission):  Peripheral IV 04/09/21 Right Forearm (Active)   Number of days: 1         Comments:      LABS / IMAGING / TELE      Labs  Results from last 7 days   Lab Units 04/10/21  0445 04/09/21  1839   WBC K/uL 2.95* 3.68*   HEMOGLOBIN g/dL 13.7 13.8   HEMATOCRIT % 41.0 41.2   PLATELETS K/uL 113* 149*   DIFF TYPE   --  Auto   NRBC %  --  0.0   IMM GRANULOCYTES %  --  0.3   NEUTROPHILS %  --  75.7   NEUTROS PCT MAN % 54  --    LYMPHOCYTES %  --  18.8   LYMPHO PCT MAN % 19  --    MONOCYTES %  --  4.9   MONO PCT MAN % 5  --    EOSINOPHILS %  --  0.0   BASOPHILS %  --  0.3   BANDS PCT MAN % 19*  --    IMM GRANUCOCYTES ABS K/uL  --  0.01   SEGS ABS MAN K/uL 1.59*  --    LYMPHO ABS MAN K/uL 0.56*  --    MONO ABS AUTO K/uL  --  0.18*   MONO ABS MAN K/uL 0.15*  --    EOS ABS AUTO K/uL  --  0.00*   BASO ABS AUTO K/uL  --  0.01     Results from last 7 days   Lab Units 04/10/21  0445 04/09/21  1839   SODIUM mEQ/L 137 135*   POTASSIUM mEQ/L 4.1 4.8   CHLORIDE mEQ/L 104 102   CO2 mEQ/L 21* 24   BUN mg/dL 19 18   CREATININE mg/dL 0.7 0.9   GLUCOSE mg/dL 320* 170*   CALCIUM mg/dL 8.3* 8.6*         No results found for: COVID19    Imaging  Procedure Component Value Units Date/Time   X-RAY CHEST 1 VIEW [[id number]] Collected: 04/09/21 1817   Order Status: Completed Updated: 04/09/21 1820   Narrative:     CLINICAL HISTORY: Covid 19 positive.     COMMENT: Upright portable view of the chest was obtained at 1808 hours. No   similar prior studies are available for comparison.     Patchy airspace opacities are noted in the lungs bilaterally, right greater than   left.  There is no pleural effusion or pneumothorax.   The heart is top normal in   size.  There are multilevel thoracic spine degenerative changes and there are   right greater than left shoulder joint degenerative changes.    Impression:     IMPRESSION: Multilobar pneumonia, consistent with the provided history of Covid   19.          ECG/Telemetry  I have independently reviewed the telemetry. No events for the last 24 hours.    ASSESSMENT AND PLAN      Acute respiratory failure with hypoxia (CMS/[facility])  Assessment & Plan  Acute respiratory failure with hypoxia, currently requiring 4 L of oxygen via nasal cannula to keep oxygen levels above 88.    This is secondary to COVID-19 pneumonia.  Continue pulmonary toileting, incentive spirometry, frequent position shifts.    Appreciate input from ID.  Remdesivir initiated, monitor CMP.  Continue vitamin C and vitamin D.  Continue 0.5mg/kg of Lovenox given vascular complications of Covid.    Decadron 6 mg IV, 10-day course.        Fibromyalgia  Assessment & Plan  Continue Lyrica.    Type 2 diabetes mellitus, without long-term current use of insulin (CMS/[facility])  Assessment & Plan  On Metformin and Amaryl, will continue.  Monitor BG levels and use ISS for coverage.  Will check Hgb A1c if not done in the last six months.    * Pneumonia due to COVID-19 virus  Assessment & Plan  With associated hypoxia, the patient saturation is 95% on 2L/min supplemental oxygen.  Found to be COVID-19 positive tonight, her  already has it.    IV Decadron, prn Albuterol.  Will check LDH and CRP levels.  Pulmonary and ID are consulted.    Further details in acute respiratory failure with hypoxia section.       VTE Assessment: Padua VTE Score: 2  VTE Prophylaxis:  Current anticoagulant orders:              enoxaparin (LOVENOX) syringe 50 mg  Every 12 hours (6a, 6p)                 Code Status: Full Code  Palliative Care Screening Score: 0   Estimated Discharge Date: 2021   Disposition Plannin2021     Fahad Mcmahan MD  4/10/2021

## 2021-04-11 LAB
ALBUMIN SERPL-MCNC: 3.4 G/DL (ref 3.4–5)
ALP SERPL-CCNC: 55 IU/L (ref 35–126)
ALT SERPL-CCNC: 21 IU/L (ref 11–54)
ANION GAP SERPL CALC-SCNC: 10 MEQ/L (ref 3–15)
AST SERPL-CCNC: 32 IU/L (ref 15–41)
BASOPHILS # BLD: 0.01 K/UL (ref 0.01–0.1)
BASOPHILS NFR BLD: 0.2 %
BILIRUB SERPL-MCNC: 0.6 MG/DL (ref 0.3–1.2)
BUN SERPL-MCNC: 23 MG/DL (ref 8–20)
CALCIUM SERPL-MCNC: 8.6 MG/DL (ref 8.9–10.3)
CHLORIDE SERPL-SCNC: 101 MEQ/L (ref 98–109)
CO2 SERPL-SCNC: 23 MEQ/L (ref 22–32)
CREAT SERPL-MCNC: 0.9 MG/DL (ref 0.6–1.1)
DIFFERENTIAL METHOD BLD: ABNORMAL
EOSINOPHIL # BLD: 0 K/UL (ref 0.04–0.36)
EOSINOPHIL NFR BLD: 0 %
ERYTHROCYTE [DISTWIDTH] IN BLOOD BY AUTOMATED COUNT: 12.6 % (ref 11.7–14.4)
GFR SERPL CREATININE-BSD FRML MDRD: >60 ML/MIN/1.73M*2
GLUCOSE BLD-MCNC: 234 MG/DL (ref 70–99)
GLUCOSE BLD-MCNC: 278 MG/DL (ref 70–99)
GLUCOSE BLD-MCNC: 338 MG/DL (ref 70–99)
GLUCOSE BLD-MCNC: 366 MG/DL (ref 70–99)
GLUCOSE SERPL-MCNC: 250 MG/DL (ref 70–99)
HCT VFR BLDCO AUTO: 39.5 % (ref 35–45)
HGB BLD-MCNC: 13.5 G/DL (ref 11.8–15.7)
IMM GRANULOCYTES # BLD AUTO: 0.01 K/UL (ref 0–0.08)
IMM GRANULOCYTES NFR BLD AUTO: 0.2 %
LYMPHOCYTES # BLD: 1.02 K/UL (ref 1.2–3.5)
LYMPHOCYTES NFR BLD: 23.3 %
MAGNESIUM SERPL-MCNC: 1.6 MG/DL (ref 1.8–2.5)
MCH RBC QN AUTO: 31.7 PG (ref 28–33.2)
MCHC RBC AUTO-ENTMCNC: 34.2 G/DL (ref 32.2–35.5)
MCV RBC AUTO: 92.7 FL (ref 83–98)
MONOCYTES # BLD: 0.19 K/UL (ref 0.28–0.8)
MONOCYTES NFR BLD: 4.3 %
NEUTROPHILS # BLD: 3.14 K/UL (ref 1.7–7)
NEUTS SEG NFR BLD: 72 %
NRBC BLD-RTO: 0 %
PDW BLD AUTO: 10.7 FL (ref 9.4–12.3)
PLATELET # BLD AUTO: 130 K/UL (ref 150–369)
POCT TEST: ABNORMAL
POTASSIUM SERPL-SCNC: 4 MEQ/L (ref 3.6–5.1)
PROT SERPL-MCNC: 6.1 G/DL (ref 6–8.2)
RBC # BLD AUTO: 4.26 M/UL (ref 3.93–5.22)
SODIUM SERPL-SCNC: 134 MEQ/L (ref 136–144)
WBC # BLD AUTO: 4.37 K/UL (ref 3.8–10.5)

## 2021-04-11 PROCEDURE — 25000000 HC PHARMACY GENERAL: Performed by: INTERNAL MEDICINE

## 2021-04-11 PROCEDURE — 99233 SBSQ HOSP IP/OBS HIGH 50: CPT | Performed by: INTERNAL MEDICINE

## 2021-04-11 PROCEDURE — 63700000 HC SELF-ADMINISTRABLE DRUG: Performed by: INTERNAL MEDICINE

## 2021-04-11 PROCEDURE — 87040 BLOOD CULTURE FOR BACTERIA: CPT | Performed by: INTERNAL MEDICINE

## 2021-04-11 PROCEDURE — 63600000 HC DRUGS/DETAIL CODE: Performed by: INTERNAL MEDICINE

## 2021-04-11 PROCEDURE — 20600000 HC ROOM AND CARE INTERMEDIATE/TELEMETRY

## 2021-04-11 PROCEDURE — 83735 ASSAY OF MAGNESIUM: CPT | Performed by: INTERNAL MEDICINE

## 2021-04-11 PROCEDURE — 80053 COMPREHEN METABOLIC PANEL: CPT | Performed by: INTERNAL MEDICINE

## 2021-04-11 PROCEDURE — 36415 COLL VENOUS BLD VENIPUNCTURE: CPT | Performed by: INTERNAL MEDICINE

## 2021-04-11 PROCEDURE — 63700000 HC SELF-ADMINISTRABLE DRUG: Performed by: HOSPITALIST

## 2021-04-11 PROCEDURE — 63600000 HC DRUGS/DETAIL CODE: Mod: JW | Performed by: HOSPITALIST

## 2021-04-11 PROCEDURE — 25800000 HC PHARMACY IV SOLUTIONS: Performed by: INTERNAL MEDICINE

## 2021-04-11 PROCEDURE — 85025 COMPLETE CBC W/AUTO DIFF WBC: CPT | Performed by: INTERNAL MEDICINE

## 2021-04-11 RX ORDER — INSULIN GLARGINE 100 [IU]/ML
10 INJECTION, SOLUTION SUBCUTANEOUS DAILY
Status: DISCONTINUED | OUTPATIENT
Start: 2021-04-11 | End: 2021-04-12

## 2021-04-11 RX ORDER — INSULIN ASPART 100 [IU]/ML
3 INJECTION, SOLUTION INTRAVENOUS; SUBCUTANEOUS
Status: DISCONTINUED | OUTPATIENT
Start: 2021-04-11 | End: 2021-04-12

## 2021-04-11 RX ORDER — INSULIN GLARGINE 100 [IU]/ML
8 INJECTION, SOLUTION SUBCUTANEOUS DAILY
Status: DISCONTINUED | OUTPATIENT
Start: 2021-04-11 | End: 2021-04-11

## 2021-04-11 RX ADMIN — ACETAMINOPHEN 650 MG: 325 TABLET ORAL at 04:45

## 2021-04-11 RX ADMIN — ALBUTEROL SULFATE 2 PUFF: 90 AEROSOL, METERED RESPIRATORY (INHALATION) at 04:39

## 2021-04-11 RX ADMIN — INSULIN ASPART 6 UNITS: 100 INJECTION, SOLUTION INTRAVENOUS; SUBCUTANEOUS at 12:30

## 2021-04-11 RX ADMIN — INSULIN ASPART 10 UNITS: 100 INJECTION, SOLUTION INTRAVENOUS; SUBCUTANEOUS at 18:10

## 2021-04-11 RX ADMIN — INSULIN ASPART 3 UNITS: 100 INJECTION, SOLUTION INTRAVENOUS; SUBCUTANEOUS at 18:10

## 2021-04-11 RX ADMIN — REMDESIVIR 100 MG: 100 INJECTION, POWDER, LYOPHILIZED, FOR SOLUTION INTRAVENOUS at 14:00

## 2021-04-11 RX ADMIN — MAGNESIUM SULFATE HEPTAHYDRATE 4 G: 4 INJECTION, SOLUTION INTRAVENOUS at 12:55

## 2021-04-11 RX ADMIN — PREGABALIN 150 MG: 75 CAPSULE ORAL at 08:44

## 2021-04-11 RX ADMIN — INSULIN ASPART 6 UNITS: 100 INJECTION, SOLUTION INTRAVENOUS; SUBCUTANEOUS at 08:45

## 2021-04-11 RX ADMIN — ENOXAPARIN SODIUM 50 MG: 60 INJECTION SUBCUTANEOUS at 04:45

## 2021-04-11 RX ADMIN — DEXAMETHASONE SODIUM PHOSPHATE 6 MG: 4 INJECTION, SOLUTION INTRA-ARTICULAR; INTRALESIONAL; INTRAMUSCULAR; INTRAVENOUS; SOFT TISSUE at 08:44

## 2021-04-11 RX ADMIN — Medication 2000 UNITS: at 18:08

## 2021-04-11 RX ADMIN — INSULIN GLARGINE 10 UNITS: 100 INJECTION, SOLUTION SUBCUTANEOUS at 13:02

## 2021-04-11 RX ADMIN — GUAIFENESIN 600 MG: 600 TABLET, EXTENDED RELEASE ORAL at 08:44

## 2021-04-11 RX ADMIN — PREGABALIN 150 MG: 75 CAPSULE ORAL at 19:59

## 2021-04-11 RX ADMIN — ENOXAPARIN SODIUM 50 MG: 60 INJECTION SUBCUTANEOUS at 18:08

## 2021-04-11 RX ADMIN — INSULIN ASPART 6 UNITS: 100 INJECTION, SOLUTION INTRAVENOUS; SUBCUTANEOUS at 21:40

## 2021-04-11 RX ADMIN — BUTALBITAL, ACETAMINOPHEN AND CAFFEINE 1 TABLET: 50; 325; 40 TABLET ORAL at 08:44

## 2021-04-11 RX ADMIN — OXYCODONE HYDROCHLORIDE AND ACETAMINOPHEN 500 MG: 500 TABLET ORAL at 18:08

## 2021-04-11 RX ADMIN — BUTALBITAL, ACETAMINOPHEN AND CAFFEINE 1 TABLET: 50; 325; 40 TABLET ORAL at 15:45

## 2021-04-11 RX ADMIN — GUAIFENESIN 600 MG: 600 TABLET, EXTENDED RELEASE ORAL at 19:59

## 2021-04-11 RX ADMIN — OXYCODONE HYDROCHLORIDE AND ACETAMINOPHEN 500 MG: 500 TABLET ORAL at 08:44

## 2021-04-11 RX ADMIN — ATORVASTATIN CALCIUM 20 MG: 20 TABLET, FILM COATED ORAL at 21:40

## 2021-04-11 RX ADMIN — CITALOPRAM HYDROBROMIDE 5 MG: 10 TABLET ORAL at 08:44

## 2021-04-11 ASSESSMENT — ENCOUNTER SYMPTOMS
SHORTNESS OF BREATH: 1
COUGH: 1
PALPITATIONS: 0
STRIDOR: 0
WHEEZING: 0

## 2021-04-11 NOTE — PROGRESS NOTES
Hospital Medicine Service -  Daily Progress Note       SUBJECTIVE   Interval History: Patient examined and interviewed at bedside.  Still complaining of shortness of breath, subjective weakness.    Patient denies  fever, night sweats, runny nose, mouth sores, sore throat,   palpitations, chest pain,  heartburn, constipation, nausea, vomiting, blood in stools, incontinence  Painful urination urgency  Joint pain  Headache, vision change, dizziness, tremor  Bleeding, blood in stools.     OBJECTIVE      Vital signs in last 24 hours:  Temp:  [37.8 °C (100.1 °F)-39.5 °C (103.1 °F)] 39.4 °C (103 °F)  Heart Rate:  [] 83  Resp:  [20-21] 21  BP: (118-151)/(61-96) 151/83  No intake or output data in the 24 hours ending 04/11/21 1127    PHYSICAL EXAMINATION      Physical Exam  Vitals and nursing note reviewed.   Constitutional:       Appearance: Normal appearance. She is obese.   HENT:      Head: Normocephalic and atraumatic.      Right Ear: External ear normal.      Left Ear: External ear normal.      Nose: Nose normal.      Mouth/Throat:      Mouth: Mucous membranes are moist.      Pharynx: Oropharynx is clear.   Eyes:      Extraocular Movements: Extraocular movements intact.      Conjunctiva/sclera: Conjunctivae normal.      Pupils: Pupils are equal, round, and reactive to light.   Cardiovascular:      Rate and Rhythm: Normal rate and regular rhythm.      Pulses: Normal pulses.      Heart sounds: Normal heart sounds.   Pulmonary:      Comments: Air entry reduced bilateral lung base.  Patient cannot talk in complete sentences, accessory muscles of respiration in use.  Abdominal:      General: Abdomen is flat. Bowel sounds are normal.      Palpations: Abdomen is soft.   Musculoskeletal:         General: Normal range of motion.      Cervical back: Normal range of motion and neck supple.   Skin:     General: Skin is warm.      Capillary Refill: Capillary refill takes less than 2 seconds.   Neurological:      General: No  focal deficit present.      Mental Status: She is alert and oriented to person, place, and time. Mental status is at baseline.   Psychiatric:         Mood and Affect: Mood normal.         Behavior: Behavior normal.         Thought Content: Thought content normal.         Judgment: Judgment normal.            LINES, CATHETERS, DRAINS, AIRWAYS, AND WOUNDS   Lines, Drains, and Airways:  Wounds (agree with documentation and present on admission):  Peripheral IV 04/09/21 Right Forearm (Active)   Number of days: 2         Comments:      LABS / IMAGING / TELE      Labs  Results from last 7 days   Lab Units 04/11/21  0619 04/10/21  0445 04/09/21  1839   WBC K/uL 4.37 2.95* 3.68*   HEMOGLOBIN g/dL 13.5 13.7 13.8   HEMATOCRIT % 39.5 41.0 41.2   PLATELETS K/uL 130* 113* 149*   DIFF TYPE  Auto  --  Auto   NRBC % 0.0  --  0.0   IMM GRANULOCYTES % 0.2  --  0.3   NEUTROPHILS % 72.0  --  75.7   NEUTROS PCT MAN %  --  54  --    LYMPHOCYTES % 23.3  --  18.8   LYMPHO PCT MAN %  --  19  --    MONOCYTES % 4.3  --  4.9   MONO PCT MAN %  --  5  --    EOSINOPHILS % 0.0  --  0.0   BASOPHILS % 0.2  --  0.3   BANDS PCT MAN %  --  19*  --    IMM GRANUCOCYTES ABS K/uL 0.01  --  0.01   SEGS ABS MAN K/uL  --  1.59*  --    LYMPHO ABS MAN K/uL  --  0.56*  --    MONO ABS AUTO K/uL 0.19*  --  0.18*   MONO ABS MAN K/uL  --  0.15*  --    EOS ABS AUTO K/uL 0.00*  --  0.00*   BASO ABS AUTO K/uL 0.01  --  0.01     Results from last 7 days   Lab Units 04/11/21  0619 04/10/21  0445 04/09/21  1839   SODIUM mEQ/L 134* 137 135*   POTASSIUM mEQ/L 4.0 4.1 4.8   CHLORIDE mEQ/L 101 104 102   CO2 mEQ/L 23 21* 24   BUN mg/dL 23* 19 18   CREATININE mg/dL 0.9 0.7 0.9   GLUCOSE mg/dL 250* 320* 170*   CALCIUM mg/dL 8.6* 8.3* 8.6*         No results found for: COVID19    Imaging  No new imaging.     ECG/Telemetry  I have independently reviewed the telemetry. No events for the last 24 hours.    ASSESSMENT AND PLAN      Acute respiratory failure with hypoxia  (CMS/[facility])  Assessment & Plan  Acute respiratory failure with hypoxia, currently requiring 4 L of oxygen via nasal cannula to keep oxygen levels above 88.    This is secondary to COVID-19 pneumonia.  Continue pulmonary toileting, incentive spirometry, frequent position shifts.    Appreciate input from ID.  Remdesivir initiated, monitor CMP.  Continue vitamin C and vitamin D.  Continue 0.5mg/kg of Lovenox given vascular complications of Covid.    Decadron 6 mg IV, 10-day course.        Sepsis (CMS/[facility])  Assessment & Plan  Patient with sepsis, secondary to COVID-19 pneumonia.  Draw blood cultures.  Follow up on blood cultures.  ID following.    Fibromyalgia  Assessment & Plan  Continue Lyrica.    Type 2 diabetes mellitus, without long-term current use of insulin (CMS/[facility])  Assessment & Plan  Cover with long-acting insulin, 10 units.  Sliding scale with meal.  HbA1c of 8.5.    We will consider endocrinology consult depending on sugar level trends..    * Pneumonia due to COVID-19 virus  Assessment & Plan  With associated hypoxia, the patient saturation is 95% on 2L/min supplemental oxygen.  Found to be COVID-19 positive tonight, her  already has it.    IV Decadron, prn Albuterol.  Will check LDH and CRP levels.  Pulmonary and ID are consulted.    Further details in acute respiratory failure with hypoxia section.       VTE Assessment: Padua VTE Score: 2  VTE Prophylaxis:  [unfilled]  Code Status: Full Code  Palliative Care Screening Score: 0   Estimated Discharge Date: 2021   Disposition Plannin2021     Fahad Mcmahan MD  2021

## 2021-04-11 NOTE — NURSING NOTE
Pt temp 103F. VSS. Tylenol administered, ice bags placed in arm pits. YUE Alejandro notified. NNO.

## 2021-04-11 NOTE — PROGRESS NOTES
"Infectious Disease Progress Note    Patient Name: Maylin Stiles  MR#: [id number]  : 1960  Admission Date: 2021  Date: 21   Time: 9:02 AM   Author: Abby Bolton MD        Antibiotics:    Anti-infectives (From admission, onward)    Start     Dose/Rate Route Frequency Ordered Stop    21 1409  remdesivir (VEKLURY) 100 mg in sodium chloride 0.9 % 250 mL IVPB      100 mg  500 mL/hr over 30 Minutes intravenous Every 24 hours interval 04/10/21 1325 04/15/21 1414          Subjective Chart reviewed, Events noted  T-max 103  4 L oxygen supplementation by nasal cannula  WBC 4.37  No cultures to review  Day 2 remdesivir  States that her headache is gone today, states appetite a little better, states that she thinks that she is moving more air today compared to yesterday so overall subjectively feels a little bit better today compared to yesterday    Review of Systems    Pertinent items are noted in HPI.    Objective     Vital Signs:    Patient Vitals for the past 72 hrs:   BP Temp Temp src Pulse Resp SpO2 Height Weight   21 0445 (!) 151/83 (!) 39.4 °C (103 °F) Oral (!) 103 (!) 21 93 % -- --   21 0400 -- -- -- 93 -- -- -- --   21 0000 (!) 147/96 -- -- 81 20 94 % -- --   04/10/21 2122 -- (!) 39.5 °C (103.1 °F) -- -- -- -- -- --   04/10/21 2000 118/61 -- -- 91 20 97 % -- --   04/10/21 1517 127/66 37.8 °C (100.1 °F) Oral 86 20 -- -- --   04/10/21 1200 -- -- -- 98 -- -- -- --   04/10/21 1125 137/74 (!) 38.3 °C (100.9 °F) Oral 80 20 94 % -- --   04/10/21 0800 -- -- -- 75 -- -- -- --   04/10/21 0400 (!) 103/51 36.9 °C (98.5 °F) Oral 85 18 95 % -- --   04/10/21 0346 -- -- -- -- -- 93 % -- --   04/10/21 0110 120/73 36.8 °C (98.2 °F) Oral 84 18 95 % 1.676 m (5' 6\") 99.8 kg (220 lb)   21 111/68 37 °C (98.6 °F) -- 81 19 93 % -- --   21 118/77 -- -- 92 20 93 % -- --   21 -- (!) 38.2 °C (100.8 °F) Oral -- -- 93 % -- --   21 -- -- -- -- -- (!) 86 % -- " "--   21 1905 140/68 -- -- 97 20 92 % -- --   21 1707 (!) 146/72 (!) 38.8 °C (101.8 °F) Oral 96 20 95 % 1.676 m (5' 6\") 102 kg (224 lb 3.2 oz)       Temp (72hrs), Av.1 °C (100.6 °F), Min:36.8 °C (98.2 °F), Max:39.5 °C (103.1 °F)      Physical Exam:    Awake, alert, weak, anicteric, normocephalic, not in any acute distress, oxygen supplementation by nasal cannula, lungs with diminished breath sounds, abdomen soft, oriented to place person and cooperative    Lines, Drains, Airways, Wounds:  Peripheral IV 21 Right Forearm (Active)   Number of days: 2       Labs:    CBC Results       04/11/21 04/10/21 04/09/21                    0619 0445 1839         WBC 4.37 2.95 3.68         RBC 4.26 4.28 4.34         HGB 13.5 13.7 13.8         HCT 39.5 41.0 41.2         MCV 92.7 95.8 94.9         MCH 31.7 32.0 31.8         MCHC 34.2 33.4 33.5          113 149         Comment for PLT at 0619 on 21: RESULTS CHECKED. SPECIMEN QUALITY CHECKED      BMP Results       04/11/21 04/10/21 04/09/21                    0619 0445 1839          137 135         K 4.0 4.1 4.8         Cl 101 104 102         CO2 23 21 24         Glucose 250 320 170         BUN 23 19 18         Creatinine 0.9 0.7 0.9         Calcium 8.6 8.3 8.6         Anion Gap 10 12 9         EGFR >60.0 >60.0 >60.0         Comment for K at 1839 on 21: Results obtained on plasma. Plasma Potassium values may be up to 0.4 mEQ/L less than serum values. The differences may be greater for patients with high platelet or white cell counts.  SLIGHT HEMOLYSIS, RESULT MAY BE INCREASED.      PT/PTT Results       21           PT 12.8           INR 1.0           PTT 36           Comment for INR at  on 21: INR has no defined significance when PT is within Reference Range.    Comment for PTT at 195 on 21: The Standard Therapeutic Range for Heparin is 68 to 101 seconds.      UA Results    No lab values " to display.     Lactate Results    No lab values to display.         Microbiology Results     ** No results found for the last 720 hours. **          Pathology Results     ** No results found for the last 720 hours. **          Echo:         Imaging:    Radiology Imaging    XR CHEST 1 VW    Narrative  CLINICAL HISTORY: Covid 19 positive.    COMMENT: Upright portable view of the chest was obtained at 1808 hours. No  similar prior studies are available for comparison.    Patchy airspace opacities are noted in the lungs bilaterally, right greater than  left.  There is no pleural effusion or pneumothorax.  The heart is top normal in  size.  There are multilevel thoracic spine degenerative changes and there are  right greater than left shoulder joint degenerative changes.    --    Impression  Multilobar pneumonia, consistent with the provided history of Covid  19.      Assessment and plan    COVID-19  -Associated COVID-19 pneumonia  -Associated acute hypoxic respiratory failure on 4 L oxygen supplementation by nasal cannula  -Day 2 of remdesivir  -Day 3 of Decadron  -Isolation precautions per protocol  -Continue to encourage awake modified proning and incentive spirometry as tolerated  -Creatinine 0.9  -AST/ALT 32/21  -CRP 74  -Leukopenia improved and WBC today at 4.37  -Ongoing fevers, temp 103 earlier this morning, check blood cultures  -Continue supportive care    Dr. Reis/Laura DEJESUS will be covering for ID team from 4/12/2021 to 4/19/2021    NOTE: Some or all of the note above was created with the use of dictation software, please note this dictation software can have anomalies in its ability to transcribe. Please contact me directly for anything that seems abnormal for clarification.

## 2021-04-11 NOTE — PROGRESS NOTES
"SUBJECTIVE: She is more comfortable this a.m.  Able to tolerate O2 via nasal cannula at 4 L persistent nonproductive cough is noted    Allergies:   Patient has no known allergies.    Review of Systems:  Review of Systems   Respiratory: Positive for cough and shortness of breath. Negative for wheezing and stridor.    Cardiovascular: Negative for chest pain, palpitations and leg swelling.   All other systems reviewed and are negative.      Input/Ouput in Last 24 hours:  No intake or output data in the 24 hours ending 04/11/21 1506    Objective     Vital Signs for the last 24 hours:  Visit Vitals  BP (!) 103/51 (BP Location: Right upper arm, Patient Position: Lying)   Pulse 68   Temp 37.4 °C (99.3 °F) (Oral)   Resp 20   Ht 1.676 m (5' 6\")   Wt 99.8 kg (220 lb)   LMP  (LMP Unknown)   SpO2 95%   BMI 35.51 kg/m²     Oxygen Therapy: Supplemental oxygen    Physical Exam:  Physical Exam  Vitals and nursing note reviewed.   Constitutional:       Appearance: Normal appearance.   HENT:      Head: Normocephalic and atraumatic.   Eyes:      General: No scleral icterus.  Cardiovascular:      Rate and Rhythm: Regular rhythm.   Pulmonary:      Comments: Diminished breath sounds  Abdominal:      Palpations: Abdomen is soft.   Musculoskeletal:         General: No swelling.   Skin:     General: Skin is warm.   Neurological:      Mental Status: She is alert and oriented to person, place, and time.   Psychiatric:         Behavior: Behavior normal.          LABS:  Results from last 7 days   Lab Units 04/11/21  0619 04/10/21  0445 04/09/21  1839   SODIUM mEQ/L 134* 137 135*   CO2 mEQ/L 23 21* 24   BUN mg/dL 23* 19 18   CALCIUM mg/dL 8.6* 8.3* 8.6*   ALBUMIN g/dL 3.4  --  4.1     Results from last 7 days   Lab Units 04/11/21  0619 04/10/21  0445 04/09/21  1839   WBC K/uL 4.37 2.95* 3.68*   PLATELETS K/uL 130* 113* 149*     Lab Results   Component Value Date    WBC 4.37 04/11/2021    HGB 13.5 04/11/2021    HCT 39.5 04/11/2021     (L) " 04/11/2021    ALT 21 04/11/2021    AST 32 04/11/2021     (L) 04/11/2021    K 4.0 04/11/2021     04/11/2021    CREATININE 0.9 04/11/2021    BUN 23 (H) 04/11/2021    CO2 23 04/11/2021    INR 1.0 04/09/2021    HGBA1C 8.5 (H) 04/10/2021       Current Facility-Administered Medications   Medication Dose Route Frequency Provider Last Rate Last Admin   • acetaminophen (TYLENOL) tablet 650 mg  650 mg oral q4h PRN oRbby Norris MD   650 mg at 04/11/21 0445   • albuterol HFA (VENTOLIN HFA) 90 mcg/actuation inhaler 2 puff  2 puff inhalation q6h PRN Robby Norris MD   2 puff at 04/11/21 0439   • ascorbic acid (VITAMIN C) tablet 500 mg  500 mg oral BID with meals Fahad Mcmahan MD   500 mg at 04/11/21 0844   • atorvastatin (LIPITOR) tablet 20 mg  20 mg oral Nightly Robby Norris MD   20 mg at 04/10/21 2110   • butalbital-acetaminophen-caff (FIORICET, ESGIC) per tablet 1 tablet  1 tablet oral q6h PRN Fahad Mcmahan MD   1 tablet at 04/11/21 0844   • cholecalciferol (vitamin D3) tablet 2,000 Units  2,000 Units oral Daily with dinner Fahad Mcmahan MD   2,000 Units at 04/10/21 1611   • citalopram (celeXA) tablet 5 mg  5 mg oral Daily Robby Norris MD   5 mg at 04/11/21 0844   • dexamethasone (DECADRON) injection 6 mg  6 mg intravenous Daily Robby Norris MD   6 mg at 04/11/21 0844   • enoxaparin (LOVENOX) syringe 50 mg  50 mg subcutaneous q12h (6a, 6p) Robby Norris MD   50 mg at 04/11/21 0445   • guaiFENesin (MUCINEX) 12 hr ER tablet 600 mg  600 mg oral BID Fahad Mcmahan MD   600 mg at 04/11/21 0844   • insulin aspart U-100 (NovoLOG) pen 3 Units  3 Units subcutaneous TID with meals Fahad Mcmahan MD       • insulin aspart U-100 (NovoLOG) pen 6-10 Units  6-10 Units subcutaneous With meals & nightly Robby Norris MD   6 Units at 04/11/21 1230   • insulin glargine U-100 (LANTUS SOLOSTAR/BASAGLAR) pen 10 Units  10 Units subcutaneous Daily Fahad Mcmahan MD   10 Units at 04/11/21  1302   • magnesium sulfate IVPB 4g in 100 mL NSS/D5W/SWFI  4 g intravenous Once Fahad Mcmahan MD 25 mL/hr at 04/11/21 1255 4 g at 04/11/21 1255   • pregabalin (LYRICA) capsule 150 mg  150 mg oral BID Robby Norris MD   150 mg at 04/11/21 0844   • remdesivir (VEKLURY) 100 mg in sodium chloride 0.9 % 250 mL IVPB  100 mg intravenous q24h INT Abby Bolton MD   Stopped at 04/11/21 1436         IMPRESSION AND PLAN :       1.  Acute hypoxemic respiratory failure, requiring O2 supplementation, secondary to Covid pneumonitis  -O2 at 4 L  -Proning and positional changes was stressed to the patient as well as I-S, she is compliant     2.  Covid pneumonitis with bibasilar interstitial infiltrates  -Decadron 6 mg IV  -She remains on remdesivir  -Follow-up of inflammatory markers

## 2021-04-11 NOTE — NURSING NOTE
Pt continues to moan, especially when febrile. Temperature decreases after administration of tylenol. Pulse ox greater that 90% on 4.5 liters oxygen. Incentive spirometry performed x4 overnight.

## 2021-04-12 LAB
ALBUMIN SERPL-MCNC: 3.3 G/DL (ref 3.4–5)
ALP SERPL-CCNC: 50 IU/L (ref 35–126)
ALT SERPL-CCNC: 19 IU/L (ref 11–54)
ANION GAP SERPL CALC-SCNC: 10 MEQ/L (ref 3–15)
AST SERPL-CCNC: 29 IU/L (ref 15–41)
BASOPHILS # BLD: 0.01 K/UL (ref 0.01–0.1)
BASOPHILS NFR BLD: 0.2 %
BILIRUB SERPL-MCNC: 0.6 MG/DL (ref 0.3–1.2)
BUN SERPL-MCNC: 25 MG/DL (ref 8–20)
CALCIUM SERPL-MCNC: 8.5 MG/DL (ref 8.9–10.3)
CHLORIDE SERPL-SCNC: 103 MEQ/L (ref 98–109)
CO2 SERPL-SCNC: 24 MEQ/L (ref 22–32)
CREAT SERPL-MCNC: 0.7 MG/DL (ref 0.6–1.1)
DIFFERENTIAL METHOD BLD: ABNORMAL
EOSINOPHIL # BLD: 0 K/UL (ref 0.04–0.36)
EOSINOPHIL NFR BLD: 0 %
ERYTHROCYTE [DISTWIDTH] IN BLOOD BY AUTOMATED COUNT: 12.5 % (ref 11.7–14.4)
GFR SERPL CREATININE-BSD FRML MDRD: >60 ML/MIN/1.73M*2
GLUCOSE BLD-MCNC: 200 MG/DL (ref 70–99)
GLUCOSE BLD-MCNC: 245 MG/DL (ref 70–99)
GLUCOSE BLD-MCNC: 303 MG/DL (ref 70–99)
GLUCOSE BLD-MCNC: 335 MG/DL (ref 70–99)
GLUCOSE SERPL-MCNC: 194 MG/DL (ref 70–99)
HCT VFR BLDCO AUTO: 40 % (ref 35–45)
HGB BLD-MCNC: 13.5 G/DL (ref 11.8–15.7)
IMM GRANULOCYTES # BLD AUTO: 0.02 K/UL (ref 0–0.08)
IMM GRANULOCYTES NFR BLD AUTO: 0.5 %
LYMPHOCYTES # BLD: 1.15 K/UL (ref 1.2–3.5)
LYMPHOCYTES NFR BLD: 28.3 %
MAGNESIUM SERPL-MCNC: 2.1 MG/DL (ref 1.8–2.5)
MCH RBC QN AUTO: 31.6 PG (ref 28–33.2)
MCHC RBC AUTO-ENTMCNC: 33.8 G/DL (ref 32.2–35.5)
MCV RBC AUTO: 93.7 FL (ref 83–98)
MONOCYTES # BLD: 0.32 K/UL (ref 0.28–0.8)
MONOCYTES NFR BLD: 7.9 %
NEUTROPHILS # BLD: 2.57 K/UL (ref 1.7–7)
NEUTS SEG NFR BLD: 63.1 %
NRBC BLD-RTO: 0 %
PDW BLD AUTO: 10.8 FL (ref 9.4–12.3)
PLATELET # BLD AUTO: 159 K/UL (ref 150–369)
POCT TEST: ABNORMAL
POTASSIUM SERPL-SCNC: 4 MEQ/L (ref 3.6–5.1)
PROT SERPL-MCNC: 6.3 G/DL (ref 6–8.2)
RBC # BLD AUTO: 4.27 M/UL (ref 3.93–5.22)
SODIUM SERPL-SCNC: 137 MEQ/L (ref 136–144)
WBC # BLD AUTO: 4.07 K/UL (ref 3.8–10.5)

## 2021-04-12 PROCEDURE — 63700000 HC SELF-ADMINISTRABLE DRUG: Performed by: INTERNAL MEDICINE

## 2021-04-12 PROCEDURE — 20600000 HC ROOM AND CARE INTERMEDIATE/TELEMETRY

## 2021-04-12 PROCEDURE — 63700000 HC SELF-ADMINISTRABLE DRUG: Performed by: HOSPITALIST

## 2021-04-12 PROCEDURE — 99233 SBSQ HOSP IP/OBS HIGH 50: CPT | Performed by: HOSPITALIST

## 2021-04-12 PROCEDURE — 25800000 HC PHARMACY IV SOLUTIONS: Performed by: INTERNAL MEDICINE

## 2021-04-12 PROCEDURE — 25000000 HC PHARMACY GENERAL: Performed by: INTERNAL MEDICINE

## 2021-04-12 PROCEDURE — 80053 COMPREHEN METABOLIC PANEL: CPT | Performed by: INTERNAL MEDICINE

## 2021-04-12 PROCEDURE — 83735 ASSAY OF MAGNESIUM: CPT | Performed by: INTERNAL MEDICINE

## 2021-04-12 PROCEDURE — 63600000 HC DRUGS/DETAIL CODE: Performed by: HOSPITALIST

## 2021-04-12 PROCEDURE — 85025 COMPLETE CBC W/AUTO DIFF WBC: CPT | Performed by: INTERNAL MEDICINE

## 2021-04-12 PROCEDURE — 36415 COLL VENOUS BLD VENIPUNCTURE: CPT | Performed by: INTERNAL MEDICINE

## 2021-04-12 RX ORDER — INSULIN ASPART 100 [IU]/ML
2-12 INJECTION, SOLUTION INTRAVENOUS; SUBCUTANEOUS
Status: DISCONTINUED | OUTPATIENT
Start: 2021-04-12 | End: 2021-04-17 | Stop reason: HOSPADM

## 2021-04-12 RX ORDER — INSULIN ASPART 100 [IU]/ML
6 INJECTION, SOLUTION INTRAVENOUS; SUBCUTANEOUS
Status: DISCONTINUED | OUTPATIENT
Start: 2021-04-12 | End: 2021-04-14

## 2021-04-12 RX ORDER — BENZONATATE 100 MG/1
100 CAPSULE ORAL EVERY 4 HOURS PRN
Status: DISCONTINUED | OUTPATIENT
Start: 2021-04-12 | End: 2021-04-17 | Stop reason: HOSPADM

## 2021-04-12 RX ORDER — INSULIN GLARGINE 100 [IU]/ML
15 INJECTION, SOLUTION SUBCUTANEOUS DAILY
Status: DISCONTINUED | OUTPATIENT
Start: 2021-04-13 | End: 2021-04-14

## 2021-04-12 RX ADMIN — GUAIFENESIN 600 MG: 600 TABLET, EXTENDED RELEASE ORAL at 20:01

## 2021-04-12 RX ADMIN — GUAIFENESIN 600 MG: 600 TABLET, EXTENDED RELEASE ORAL at 08:01

## 2021-04-12 RX ADMIN — ENOXAPARIN SODIUM 50 MG: 60 INJECTION SUBCUTANEOUS at 05:20

## 2021-04-12 RX ADMIN — ACETAMINOPHEN 650 MG: 325 TABLET ORAL at 20:01

## 2021-04-12 RX ADMIN — INSULIN GLARGINE 10 UNITS: 100 INJECTION, SOLUTION SUBCUTANEOUS at 08:06

## 2021-04-12 RX ADMIN — BUTALBITAL, ACETAMINOPHEN AND CAFFEINE 1 TABLET: 50; 325; 40 TABLET ORAL at 05:20

## 2021-04-12 RX ADMIN — MOMETASONE FUROATE AND FORMOTEROL FUMARATE DIHYDRATE 2 PUFF: 200; 5 AEROSOL RESPIRATORY (INHALATION) at 18:14

## 2021-04-12 RX ADMIN — INSULIN ASPART 8 UNITS: 100 INJECTION, SOLUTION INTRAVENOUS; SUBCUTANEOUS at 16:36

## 2021-04-12 RX ADMIN — INSULIN ASPART 6 UNITS: 100 INJECTION, SOLUTION INTRAVENOUS; SUBCUTANEOUS at 12:17

## 2021-04-12 RX ADMIN — PREGABALIN 150 MG: 75 CAPSULE ORAL at 20:02

## 2021-04-12 RX ADMIN — OXYCODONE HYDROCHLORIDE AND ACETAMINOPHEN 500 MG: 500 TABLET ORAL at 08:02

## 2021-04-12 RX ADMIN — ACETAMINOPHEN 650 MG: 325 TABLET ORAL at 05:25

## 2021-04-12 RX ADMIN — INSULIN ASPART 8 UNITS: 100 INJECTION, SOLUTION INTRAVENOUS; SUBCUTANEOUS at 21:34

## 2021-04-12 RX ADMIN — INSULIN ASPART 3 UNITS: 100 INJECTION, SOLUTION INTRAVENOUS; SUBCUTANEOUS at 08:04

## 2021-04-12 RX ADMIN — INSULIN ASPART 6 UNITS: 100 INJECTION, SOLUTION INTRAVENOUS; SUBCUTANEOUS at 08:05

## 2021-04-12 RX ADMIN — CITALOPRAM HYDROBROMIDE 5 MG: 10 TABLET ORAL at 08:01

## 2021-04-12 RX ADMIN — OXYCODONE HYDROCHLORIDE AND ACETAMINOPHEN 500 MG: 500 TABLET ORAL at 16:36

## 2021-04-12 RX ADMIN — DEXAMETHASONE SODIUM PHOSPHATE 6 MG: 4 INJECTION, SOLUTION INTRA-ARTICULAR; INTRALESIONAL; INTRAMUSCULAR; INTRAVENOUS; SOFT TISSUE at 08:02

## 2021-04-12 RX ADMIN — BENZONATATE 100 MG: 100 CAPSULE ORAL at 11:51

## 2021-04-12 RX ADMIN — Medication 2000 UNITS: at 16:36

## 2021-04-12 RX ADMIN — INSULIN ASPART 6 UNITS: 100 INJECTION, SOLUTION INTRAVENOUS; SUBCUTANEOUS at 16:36

## 2021-04-12 RX ADMIN — PREGABALIN 150 MG: 75 CAPSULE ORAL at 08:02

## 2021-04-12 RX ADMIN — ENOXAPARIN SODIUM 50 MG: 60 INJECTION SUBCUTANEOUS at 18:13

## 2021-04-12 RX ADMIN — ATORVASTATIN CALCIUM 20 MG: 20 TABLET, FILM COATED ORAL at 21:30

## 2021-04-12 RX ADMIN — BENZONATATE 100 MG: 100 CAPSULE ORAL at 21:30

## 2021-04-12 RX ADMIN — INSULIN ASPART 3 UNITS: 100 INJECTION, SOLUTION INTRAVENOUS; SUBCUTANEOUS at 12:17

## 2021-04-12 RX ADMIN — BENZONATATE 100 MG: 100 CAPSULE ORAL at 16:36

## 2021-04-12 RX ADMIN — REMDESIVIR 100 MG: 100 INJECTION, POWDER, LYOPHILIZED, FOR SOLUTION INTRAVENOUS at 14:09

## 2021-04-12 NOTE — PLAN OF CARE
Plan of Care Review  Plan of Care Reviewed With: patient  Progress: improving  Outcome Summary: patient AAox3, VSS. Maintained on 4L of Nasal cannula overnight, Slept well throughout the night. Complaints of a mild headache. PRN medications given as needed. Will continue to monitor.

## 2021-04-12 NOTE — PATIENT CARE CONFERENCE
Care Progression Rounds Note  Date: 4/12/2021  Time: 2:05 PM     Patient Name: Maylin Stiles     Medical Record Number: [id number]   YOB: 1960  Sex: Female      Room/Bed: 4507D    Admitting Diagnosis: Hypoxia [R09.02]  Pneumonia due to COVID-19 virus [U07.1, J12.82]   Admit Date/Time: 4/9/2021  5:48 PM    Primary Diagnosis: Pneumonia due to COVID-19 virus  Principal Problem: Pneumonia due to COVID-19 virus    GMLOS: pending  Anticipated Discharge Date: 4/14/2021    AM-PAC:  Mobility Score:      Discharge Planning:  Living Arrangements: house  Anticipated Discharge Disposition: home without services    Barriers to Discharge:  Barriers to Discharge: Medical issues not resolved  Comment: 5L NC, dizzy, decadron/remdesivir (day 3), PT/OT to be ordered    Participants:  , nursing, social work/services

## 2021-04-12 NOTE — PROGRESS NOTES
Pulmonary Progress Note      Subjective:  Resting in bed. Comfortable on 6LNC.  Offers no acute respiratory complaints.  Continues to cough. Worsens w/ deep breathing and exertion.  Tolerating ambulation without dyspnea.   Discussed case w/ RN.  SpO2 89-91% on 6L after ambulation.  Asymptomatic clinically.  Denies any fevers, chills, chest congestion, productive cough.  Chart/labs/vitals reviewed.    Allergies: Patient has no known allergies.    Medications:  Current Facility-Administered Medications   Medication Dose Route Frequency Provider Last Rate Last Admin   • acetaminophen (TYLENOL) tablet 650 mg  650 mg oral q4h PRN Robby Norris MD   650 mg at 04/12/21 0525   • albuterol HFA (VENTOLIN HFA) 90 mcg/actuation inhaler 2 puff  2 puff inhalation q6h PRN Robby Norris MD   2 puff at 04/11/21 0439   • ascorbic acid (VITAMIN C) tablet 500 mg  500 mg oral BID with meals Fahad Mcmahan MD   500 mg at 04/12/21 0802   • atorvastatin (LIPITOR) tablet 20 mg  20 mg oral Nightly Robby Norris MD   20 mg at 04/11/21 2140   • benzonatate (TESSALON) capsule 100 mg  100 mg oral q4h PRN Delia Costello MD   100 mg at 04/12/21 1151   • butalbital-acetaminophen-caff (FIORICET, ESGIC) per tablet 1 tablet  1 tablet oral q6h PRN Fahad Mcmahan MD   1 tablet at 04/12/21 0520   • cholecalciferol (vitamin D3) tablet 2,000 Units  2,000 Units oral Daily with dinner Fahad Mcmahan MD   2,000 Units at 04/11/21 1808   • citalopram (celeXA) tablet 5 mg  5 mg oral Daily Robby Norris MD   5 mg at 04/12/21 0801   • dexamethasone (DECADRON) injection 6 mg  6 mg intravenous Daily Robby Norris MD   6 mg at 04/12/21 0802   • enoxaparin (LOVENOX) syringe 50 mg  50 mg subcutaneous q12h (6a, 6p) Robby Norris MD   50 mg at 04/12/21 0520   • guaiFENesin (MUCINEX) 12 hr ER tablet 600 mg  600 mg oral BID Fahad Mcmahan MD   600 mg at 04/12/21 0801   • insulin aspart U-100 (NovoLOG) pen 3 Units  3 Units subcutaneous TID  with meals Fahad Mcmahan MD   3 Units at 04/12/21 1217   • insulin aspart U-100 (NovoLOG) pen 6-10 Units  6-10 Units subcutaneous With meals & nightly Robby Norris MD   6 Units at 04/12/21 1217   • insulin glargine U-100 (LANTUS SOLOSTAR/BASAGLAR) pen 10 Units  10 Units subcutaneous Daily Fahad Mcmahan MD   10 Units at 04/12/21 0806   • mometasone-formoterol (DULERA 200) 200-5 mcg/actuation inhaler 2 puff  2 puff inhalation BID (6a, 6p) Mayra Hester MD       • pregabalin (LYRICA) capsule 150 mg  150 mg oral BID Robby Norris MD   150 mg at 04/12/21 0802   • remdesivir (VEKLURY) 100 mg in sodium chloride 0.9 % 250 mL IVPB  100 mg intravenous q24h INT Abby Bolton MD   Stopped at 04/11/21 1436      Review of Systems:  Unchanged except as noted in HPI    Vital signs in last 24 hours:  Temp:  [37.1 °C (98.7 °F)-37.9 °C (100.3 °F)] 37.1 °C (98.7 °F)  Heart Rate:  [67-76] 67  Resp:  [16-20] 18  BP: (101-114)/(60-69) 103/62    Input/Ouput in Last 24 hours:  No intake or output data in the 24 hours ending 04/12/21 1249    Physical Exam  General: Well appearing female, NAD, on 6L low flow cannula  HEENT: Moist membranes, EOMI, PERRL, anicteric sclera   Neck: Supple, no JVD, no tug or stridor, trach midline  Cardiac: RRR, +S1/S2, no murmur appreciated  Lungs: Normal effort, respirations not labored  Diminished breath sounds bilaterally  Generally clear throughout  No wheeze/rhonchi appreciated  Abdomen: Soft, NT/ND, +BS, no rebound/guarding   : Deferred  Extremities: No edema, clubbing, cyanosis  Musculoskeletal: No joint deformity  Vascular: No ischemia  Neuro: AAOx3, nonfocal  Skin: Warm, limited exam, defer to attending/nursing  Psych: Cooperative, appropriate    Labs:    Lab Results   Component Value Date    WBC 4.07 04/12/2021    HGB 13.5 04/12/2021    HCT 40.0 04/12/2021     04/12/2021    ALT 19 04/12/2021    AST 29 04/12/2021     04/12/2021    K 4.0 04/12/2021     04/12/2021     CREATININE 0.7 04/12/2021    BUN 25 (H) 04/12/2021    CO2 24 04/12/2021    INR 1.0 04/09/2021    HGBA1C 8.5 (H) 04/10/2021     Imaging:  No new chest imaging to review.    IMPRESSION AND PLAN    Acute hypoxic respiratory failure  Not O2 dependent prehospital. O2 needs secondary to COVID19 infection.  - Currently on 6L low flow nasal cannula.  - Titrate FiO2 to maintain SpO2 >= 90%  -- Wean as condition permits.  -- Option of Salter cannula if unable to achieve goal.  - Encourage incentive spirometry.  - Modified proning tolerates.     COVID19 infection  Pneumonia  PCR+ 4/9. Chest imaging with bibasilar infiltrates.  - 6mg IV Decadron daily. Day #4 of therapy. Recommend 10 day course of therapy.  - IV Remdesivir, day #3 of therapy.  - Start Dulera, 200-5 mcg, 2 puffs twice daily.  - VTE ppx subcutaneous Lovenox 0.5 mg/kg BID.  - Repeat chest x-ray PRN.    -- Additional history per EMR/attending.    Diet: Regular consistency, cardiac diet  DVT prophylaxis: subcutaneous Lovenox 40mg daily  GI prophylaxis: Protonix  Code status: Full code  Case d/w: patient, RN Rachel, Dr. Hester

## 2021-04-12 NOTE — PROGRESS NOTES
Hospital Medicine Service -  Daily Progress Note       SUBJECTIVE   Interval History:   Patient seen and examined at bed side this am   She is feeling better except her cough which is intractable with deep breath   Afebrile less then 48 hrs ,   No chest pain or tight ness , no over night event noted .     OBJECTIVE      Vital signs in last 24 hours:  Temp:  [37.1 °C (98.7 °F)-37.9 °C (100.3 °F)] 37.1 °C (98.7 °F)  Heart Rate:  [67-76] 67  Resp:  [16-20] 18  BP: (101-114)/(60-69) 103/62  No intake or output data in the 24 hours ending 04/12/21 1429    PHYSICAL EXAMINATION      Physical Exam    HEENT : Normocephalic / vision intact .  LUNGS : Decreased air entry in both lungs   HEART : Regular / no Murmur   ABDOMEN : Soft / non tender / audible BS   EXTREMITY :  No edema   MUSCULOSKELETAL : No deformity   CNS : AAO X 3 , moving all limbs equally   BEHAVIORAL : Co-operative   SKIN .: No erythema / rash / ulcer      LINES, CATHETERS, DRAINS, AIRWAYS, AND WOUNDS   Lines, Drains, and Airways:  Wounds (agree with documentation and present on admission):  Peripheral IV 04/09/21 Right Forearm (Active)   Number of days: 3         Comments:      LABS / IMAGING / TELE      Labs  Renal function /LFT and Lytes normal     No results found for: COVID19    Imaging  CXR  On admission - with multifocal pneumonia     ECG/Telemetry  NSR     ASSESSMENT AND PLAN      * Pneumonia due to COVID-19 virus  Assessment & Plan  With associated hypoxia, the patient saturation is 95% on 2L/min supplemental oxygen.  Found to be COVID-19 positive on 4/9 , her  already has it.    IV Decadron, prn Albuterol.  Pulmonary and ID are following     Further details in acute respiratory failure with hypoxia section.    Sepsis (CMS/HCC)  Assessment & Plan  Patient with sepsis, secondary to COVID-19 pneumonia.  Draw blood cultures.  Follow up on blood cultures.  ID following.    Acute respiratory failure with hypoxia (CMS/HCC)  Assessment &  Plan  Acute respiratory failure with hypoxia, currently requiring 4 L of oxygen via nasal cannula to keep oxygen levels above 88.    This is secondary to COVID-19 pneumonia.  Continue pulmonary toileting, incentive spirometry, frequent position shifts.    Appreciate input from ID.  Remdesivir initiated, monitor CMP.  Continue 0.5mg/kg of Lovenox given vascular complications of Covid.  Started on Dulera for intractable cough     Decadron 6 mg IV, 10-day course.or once hypoxia resolves   Home oxygen eval prior to discharge .      Fibromyalgia  Assessment & Plan  Continue Lyrica.    Type 2 diabetes mellitus, without long-term current use of insulin (CMS/[facility])  Assessment & Plan      HbA1c of 8.5.    Holding oral med while on IV decadron and adjusted dose of basal bolus insulin with correction scale .         VTE Assessment: Pad[place] VTE Score: 2  VTE Prophylaxis:  Current anticoagulants:  enoxaparin (LOVENOX) syringe 50 mg, subcutaneous, q12h (6a, 6p)      Code Status: Full Code  Palliative Care Screening Score: 0   Estimated Discharge Date: 4/14/2021   Disposition Planning: depends on clinical progress      Delia Costello MD  4/12/2021

## 2021-04-12 NOTE — PROGRESS NOTES
Infectious Disease Progress Note    Patient Name: Maylin Stiles  MR#: [id number]  : 1960  Admission Date: 2021  Date: 21   Time: 1:50 PM   Author: Anup Reis MD    Major Events: Pt alert no fever sore throat chest pain abdominal  discomfort    Antibiotics:    Anti-infectives (From admission, onward)    Start     Dose/Rate Route Frequency Ordered Stop    21 1409  remdesivir (VEKLURY) 100 mg in sodium chloride 0.9 % 250 mL IVPB      100 mg  500 mL/hr over 30 Minutes intravenous Every 24 hours interval 04/10/21 1325 04/15/21 1414          Subjective     Review of Systems    Pertinent items are noted in HPI.    Objective     Vital Signs:    Patient Vitals for the past 72 hrs:   BP Temp Temp src Pulse Resp SpO2 Height Weight   21 1158 103/62 37.1 °C (98.7 °F) Oral 67 18 95 % -- --   21 1000 -- -- -- 70 -- -- -- --   21 0841 101/63 37.3 °C (99.1 °F) Oral 69 18 96 % -- --   21 0500 110/65 37.9 °C (100.3 °F) Oral 73 16 93 % -- --   21 0400 -- -- -- 70 -- -- -- --   21 0000 -- -- -- 69 -- -- -- --   21 2311 114/69 37.4 °C (99.3 °F) Oral 72 16 93 % -- --   21 2000 109/60 37.6 °C (99.6 °F) Oral 76 19 93 % -- --   21 1623 113/63 37.2 °C (98.9 °F) Oral 75 20 -- -- --   21 1136 (!) 103/51 37.4 °C (99.3 °F) Oral 68 20 95 % -- --   21 0800 -- -- -- 83 -- -- -- --   21 0445 (!) 151/83 (!) 39.4 °C (103 °F) Oral (!) 103 (!) 21 93 % -- --   21 0400 -- -- -- 93 -- -- -- --   21 0000 (!) 147/96 -- -- 81 20 94 % -- --   04/10/21 2122 -- (!) 39.5 °C (103.1 °F) -- -- -- -- -- --   04/10/21 2000 118/61 -- -- 91 20 97 % -- --   04/10/21 1517 127/66 37.8 °C (100.1 °F) Oral 86 20 -- -- --   04/10/21 1200 -- -- -- 98 -- -- -- --   04/10/21 1125 137/74 (!) 38.3 °C (100.9 °F) Oral 80 20 94 % -- --   04/10/21 0800 -- -- -- 75 -- -- -- --   04/10/21 0400 (!) 103/51 36.9 °C (98.5 °F) Oral 85 18 95 % -- --   04/10/21 0346 -- -- -- -- --  "93 % -- --   04/10/21 0110 120/73 36.8 °C (98.2 °F) Oral 84 18 95 % 1.676 m (5' 6\") 99.8 kg (220 lb)   21 2235 111/68 37 °C (98.6 °F) -- 81 19 93 % -- --   21 211 118/77 -- -- 92 20 93 % -- --   21 -- (!) 38.2 °C (100.8 °F) Oral -- -- 93 % -- --   21 1910 -- -- -- -- -- (!) 86 % -- --   21 1905 140/68 -- -- 97 20 92 % -- --   21 1707 (!) 146/72 (!) 38.8 °C (101.8 °F) Oral 96 20 95 % 1.676 m (5' 6\") 102 kg (224 lb 3.2 oz)       Temp (72hrs), Av.8 °C (100 °F), Min:36.8 °C (98.2 °F), Max:39.5 °C (103.1 °F)      Physical Exam:    Anicteric  No stridor  Lung moderate respiratory distress  abd active bs no distension  Iv access intact  Neuro cn intact ms 5/5 upper and lower    Lines, Drains, Airways, Wounds:  Peripheral IV 21 Right Forearm (Active)   Number of days: 3       Labs:    CBC Results       04/12/21 04/11/21 04/10/21                    0535 0619 0445         WBC 4.07 4.37 2.95         RBC 4.27 4.26 4.28         HGB 13.5 13.5 13.7         HCT 40.0 39.5 41.0         MCV 93.7 92.7 95.8         MCH 31.6 31.7 32.0         MCHC 33.8 34.2 33.4          130 113         Comment for PLT at 0619 on 21: RESULTS CHECKED. SPECIMEN QUALITY CHECKED      BMP Results       04/12/21 04/11/21 04/10/21                    0535 0619 0445          134 137         K 4.0 4.0 4.1         Cl 103 101 104         CO2 24 23 21         Glucose 194 250 320         BUN 25 23 19         Creatinine 0.7 0.9 0.7         Calcium 8.5 8.6 8.3         Anion Gap 10 10 12         EGFR >60.0 >60.0 >60.0                   PT/PTT Results       21           PT 12.8           INR 1.0           PTT 36           Comment for INR at  on 21: INR has no defined significance when PT is within Reference Range.    Comment for PTT at  on 21: The Standard Therapeutic Range for Heparin is 68 to 101 seconds.      UA Results    No lab values to " display.     Lactate Results    No lab values to display.         Microbiology Results     ** No results found for the last 720 hours. **          Pathology Results     ** No results found for the last 720 hours. **          Echo:         Imaging:    Radiology Imaging    XR CHEST 1 VW    Narrative  CLINICAL HISTORY: Covid 19 positive.    COMMENT: Upright portable view of the chest was obtained at 1808 hours. No  similar prior studies are available for comparison.    Patchy airspace opacities are noted in the lungs bilaterally, right greater than  left.  There is no pleural effusion or pneumothorax.  The heart is top normal in  size.  There are multilevel thoracic spine degenerative changes and there are  right greater than left shoulder joint degenerative changes.    --    Impression  Multilobar pneumonia, consistent with the provided history of Covid  19.      Assessment     covid-19   Pneumonia  remdesivir dexamethasone  Blood cultures analysis pending        Plan     Follow oxygen requirements    discussed clinical presentation with pt

## 2021-04-12 NOTE — PLAN OF CARE
Problem: Adult Inpatient Plan of Care  Goal: Plan of Care Review  Flowsheets (Taken 4/12/2021 1412)  Outcome Summary: Discussed in rounds: On O2- 5L NC, IV Decadron/Remdesivir, day 3 Dizzy and SOB with ambulation. Will need home O2 eval and assesss for home care at discharge

## 2021-04-13 LAB
ALBUMIN SERPL-MCNC: 3.6 G/DL (ref 3.4–5)
ALP SERPL-CCNC: 58 IU/L (ref 35–126)
ALT SERPL-CCNC: 24 IU/L (ref 11–54)
ANION GAP SERPL CALC-SCNC: 9 MEQ/L (ref 3–15)
AST SERPL-CCNC: 33 IU/L (ref 15–41)
BASOPHILS # BLD: 0 K/UL (ref 0.01–0.1)
BASOPHILS NFR BLD: 0 %
BILIRUB SERPL-MCNC: 0.6 MG/DL (ref 0.3–1.2)
BUN SERPL-MCNC: 25 MG/DL (ref 8–20)
CALCIUM SERPL-MCNC: 9.1 MG/DL (ref 8.9–10.3)
CHLORIDE SERPL-SCNC: 102 MEQ/L (ref 98–109)
CO2 SERPL-SCNC: 29 MEQ/L (ref 22–32)
CREAT SERPL-MCNC: 0.8 MG/DL (ref 0.6–1.1)
DIFFERENTIAL METHOD BLD: ABNORMAL
EOSINOPHIL # BLD: 0 K/UL (ref 0.04–0.36)
EOSINOPHIL NFR BLD: 0 %
ERYTHROCYTE [DISTWIDTH] IN BLOOD BY AUTOMATED COUNT: 12.4 % (ref 11.7–14.4)
GFR SERPL CREATININE-BSD FRML MDRD: >60 ML/MIN/1.73M*2
GIANT PLATELETS BLD QL SMEAR: ABNORMAL
GLUCOSE BLD-MCNC: 193 MG/DL (ref 70–99)
GLUCOSE BLD-MCNC: 259 MG/DL (ref 70–99)
GLUCOSE BLD-MCNC: 271 MG/DL (ref 70–99)
GLUCOSE BLD-MCNC: 309 MG/DL (ref 70–99)
GLUCOSE SERPL-MCNC: 210 MG/DL (ref 70–99)
HCT VFR BLDCO AUTO: 42.9 % (ref 35–45)
HGB BLD-MCNC: 14.5 G/DL (ref 11.8–15.7)
LYMPHOCYTES # BLD: 1.34 K/UL (ref 1.2–3.5)
LYMPHOCYTES NFR BLD: 31 %
MCH RBC QN AUTO: 32 PG (ref 28–33.2)
MCHC RBC AUTO-ENTMCNC: 33.8 G/DL (ref 32.2–35.5)
MCV RBC AUTO: 94.7 FL (ref 83–98)
MONOCYTES # BLD: 0.3 K/UL (ref 0.28–0.8)
MONOCYTES NFR BLD: 7 %
NEUTS BAND # BLD: 0.09 K/UL (ref 0–0.53)
NEUTS BAND # BLD: 2.59 K/UL (ref 1.7–7)
NEUTS BAND NFR BLD: 2 %
NEUTS SEG NFR BLD: 60 %
PDW BLD AUTO: 10.9 FL (ref 9.4–12.3)
PLATELET # BLD AUTO: 208 K/UL (ref 150–369)
PLATELET # BLD EST: ABNORMAL 10*3/UL
POCT TEST: ABNORMAL
POTASSIUM SERPL-SCNC: 4.1 MEQ/L (ref 3.6–5.1)
PROT SERPL-MCNC: 6.7 G/DL (ref 6–8.2)
RBC # BLD AUTO: 4.53 M/UL (ref 3.93–5.22)
SODIUM SERPL-SCNC: 140 MEQ/L (ref 136–144)
WBC # BLD AUTO: 4.31 K/UL (ref 3.8–10.5)

## 2021-04-13 PROCEDURE — 36415 COLL VENOUS BLD VENIPUNCTURE: CPT | Performed by: INTERNAL MEDICINE

## 2021-04-13 PROCEDURE — 80053 COMPREHEN METABOLIC PANEL: CPT | Performed by: INTERNAL MEDICINE

## 2021-04-13 PROCEDURE — 63600000 HC DRUGS/DETAIL CODE: Mod: JW | Performed by: HOSPITALIST

## 2021-04-13 PROCEDURE — 63700000 HC SELF-ADMINISTRABLE DRUG: Performed by: HOSPITALIST

## 2021-04-13 PROCEDURE — 63700000 HC SELF-ADMINISTRABLE DRUG: Performed by: INTERNAL MEDICINE

## 2021-04-13 PROCEDURE — 25800000 HC PHARMACY IV SOLUTIONS: Performed by: INTERNAL MEDICINE

## 2021-04-13 PROCEDURE — 25000000 HC PHARMACY GENERAL: Performed by: INTERNAL MEDICINE

## 2021-04-13 PROCEDURE — 85025 COMPLETE CBC W/AUTO DIFF WBC: CPT | Performed by: INTERNAL MEDICINE

## 2021-04-13 PROCEDURE — 20600000 HC ROOM AND CARE INTERMEDIATE/TELEMETRY

## 2021-04-13 PROCEDURE — 99232 SBSQ HOSP IP/OBS MODERATE 35: CPT | Mod: CR | Performed by: HOSPITALIST

## 2021-04-13 RX ADMIN — DEXAMETHASONE SODIUM PHOSPHATE 6 MG: 4 INJECTION, SOLUTION INTRA-ARTICULAR; INTRALESIONAL; INTRAMUSCULAR; INTRAVENOUS; SOFT TISSUE at 09:11

## 2021-04-13 RX ADMIN — ENOXAPARIN SODIUM 50 MG: 60 INJECTION SUBCUTANEOUS at 05:41

## 2021-04-13 RX ADMIN — CITALOPRAM HYDROBROMIDE 5 MG: 10 TABLET ORAL at 09:19

## 2021-04-13 RX ADMIN — INSULIN ASPART 6 UNITS: 100 INJECTION, SOLUTION INTRAVENOUS; SUBCUTANEOUS at 17:11

## 2021-04-13 RX ADMIN — OXYCODONE HYDROCHLORIDE AND ACETAMINOPHEN 500 MG: 500 TABLET ORAL at 09:09

## 2021-04-13 RX ADMIN — INSULIN ASPART 6 UNITS: 100 INJECTION, SOLUTION INTRAVENOUS; SUBCUTANEOUS at 12:48

## 2021-04-13 RX ADMIN — MOMETASONE FUROATE AND FORMOTEROL FUMARATE DIHYDRATE 2 PUFF: 200; 5 AEROSOL RESPIRATORY (INHALATION) at 05:42

## 2021-04-13 RX ADMIN — Medication 2000 UNITS: at 16:03

## 2021-04-13 RX ADMIN — INSULIN ASPART 2 UNITS: 100 INJECTION, SOLUTION INTRAVENOUS; SUBCUTANEOUS at 09:27

## 2021-04-13 RX ADMIN — GUAIFENESIN 600 MG: 600 TABLET, EXTENDED RELEASE ORAL at 20:21

## 2021-04-13 RX ADMIN — ALBUTEROL SULFATE 2 PUFF: 90 AEROSOL, METERED RESPIRATORY (INHALATION) at 09:28

## 2021-04-13 RX ADMIN — ENOXAPARIN SODIUM 50 MG: 60 INJECTION SUBCUTANEOUS at 16:03

## 2021-04-13 RX ADMIN — INSULIN ASPART 6 UNITS: 100 INJECTION, SOLUTION INTRAVENOUS; SUBCUTANEOUS at 09:27

## 2021-04-13 RX ADMIN — GUAIFENESIN 600 MG: 600 TABLET, EXTENDED RELEASE ORAL at 09:10

## 2021-04-13 RX ADMIN — ATORVASTATIN CALCIUM 20 MG: 20 TABLET, FILM COATED ORAL at 20:21

## 2021-04-13 RX ADMIN — PREGABALIN 150 MG: 75 CAPSULE ORAL at 09:10

## 2021-04-13 RX ADMIN — MOMETASONE FUROATE AND FORMOTEROL FUMARATE DIHYDRATE 2 PUFF: 200; 5 AEROSOL RESPIRATORY (INHALATION) at 17:24

## 2021-04-13 RX ADMIN — OXYCODONE HYDROCHLORIDE AND ACETAMINOPHEN 500 MG: 500 TABLET ORAL at 16:03

## 2021-04-13 RX ADMIN — INSULIN ASPART 8 UNITS: 100 INJECTION, SOLUTION INTRAVENOUS; SUBCUTANEOUS at 17:11

## 2021-04-13 RX ADMIN — INSULIN ASPART 6 UNITS: 100 INJECTION, SOLUTION INTRAVENOUS; SUBCUTANEOUS at 21:25

## 2021-04-13 RX ADMIN — REMDESIVIR 100 MG: 100 INJECTION, POWDER, LYOPHILIZED, FOR SOLUTION INTRAVENOUS at 14:17

## 2021-04-13 RX ADMIN — PREGABALIN 150 MG: 75 CAPSULE ORAL at 20:21

## 2021-04-13 RX ADMIN — BENZONATATE 100 MG: 100 CAPSULE ORAL at 09:34

## 2021-04-13 RX ADMIN — INSULIN GLARGINE 15 UNITS: 100 INJECTION, SOLUTION SUBCUTANEOUS at 09:26

## 2021-04-13 NOTE — PATIENT CARE CONFERENCE
Care Progression Rounds Note  Date: 4/13/2021  Time: 12:42 PM     Patient Name: Maylin Stiles     Medical Record Number: [id number]   YOB: 1960  Sex: Female      Room/Bed: 4507D    Admitting Diagnosis: Hypoxia [R09.02]  Pneumonia due to COVID-19 virus [U07.1, J12.82]   Admit Date/Time: 4/9/2021  5:48 PM    Primary Diagnosis: Pneumonia due to COVID-19 virus  Principal Problem: Pneumonia due to COVID-19 virus    GMLOS: 4.8  Anticipated Discharge Date: 4/16/2021    AM-PAC:  Mobility Score:      Discharge Planning:  Living Arrangements: house  Anticipated Discharge Disposition: home without services    Barriers to Discharge:  Barriers to Discharge: Medical issues not resolved  Comment: oxygen increased to 15L [place], decadron/remdesivir day 4, PT/OT, dizzy    Participants:  , nursing, social work/services

## 2021-04-13 NOTE — PLAN OF CARE
Problem: Adult Inpatient Plan of Care  Goal: Plan of Care Review  Outcome: Progressing  Flowsheets (Taken 4/13/2021 2743)  Progress: improving  Plan of Care Reviewed With: patient  Outcome Summary: Pt aaox4.  Tylenol given for body aches with relief. Tessalon perles given per pt request. Pt is coughing but non productive. Pt is on 6L high flow maintaining O2 saturation greater than 93%. Up to BSC voiding large yellow urine @ a time. Blood sugar covered with sliding scale see MAR. Call light in reach. Will continue to monitor.     Problem: Adult Inpatient Plan of Care  Goal: Absence of Hospital-Acquired Illness or Injury  Outcome: Progressing     Problem: Infection  Goal: Infection Symptom Resolution  Outcome: Progressing     Problem: Fall Injury Risk  Goal: Absence of Fall and Fall-Related Injury  Outcome: Progressing

## 2021-04-13 NOTE — PROGRESS NOTES
Infectious Disease Progress Note    Patient Name: Maylin Stiles  MR#: [id number]  : 1960  Admission Date: 2021  Date: 21   Time: 12:49 PM   Author: ANJELICA Glass    Major Events:     Antibiotics:    Anti-infectives (From admission, onward)    Start     Dose/Rate Route Frequency Ordered Stop    21 1409  remdesivir (VEKLURY) 100 mg in sodium chloride 0.9 % 250 mL IVPB      100 mg  500 mL/hr over 30 Minutes intravenous Every 24 hours interval 04/10/21 1325 04/15/21 1414          Subjective   Patient states she having a little pain in her back, she admits to cough but no productive sputum, tolerating diet, states she is having trouble using the spirometer due to frequent coughing    Review of Systems    Pertinent items are noted in HPI.    Objective     Vital Signs:    Patient Vitals for the past 72 hrs:   BP Temp Temp src Pulse Resp SpO2   21 1200 107/61 37 °C (98.6 °F) Oral 66 20 95 %   21 0836 128/79 36.8 °C (98.3 °F) Axillary 64 20 93 %   21 0400 119/67 36.9 °C (98.5 °F) Oral 67 20 95 %   21 0020 -- -- -- -- -- 94 %   21 0000 130/73 36.6 °C (97.9 °F) Oral 65 20 94 %   21 114/74 36.8 °C (98.3 °F) Oral 68 20 95 %   21 -- -- -- -- -- 95 %   21 -- -- -- 71 -- --   21 1600 -- -- -- 64 -- --   21 1547 (!) 100/49 37.2 °C (98.9 °F) Oral 66 18 (!) 91 %   21 1200 -- -- -- 80 -- --   21 1158 103/62 37.1 °C (98.7 °F) Oral 67 18 95 %   21 1000 -- -- -- 70 -- --   21 0841 101/63 37.3 °C (99.1 °F) Oral 69 18 96 %   21 0800 -- -- -- 67 -- --   21 0500 110/65 37.9 °C (100.3 °F) Oral 73 16 93 %   21 0400 -- -- -- 70 -- --   21 0000 -- -- -- 69 -- --   21 2311 114/69 37.4 °C (99.3 °F) Oral 72 16 93 %   21 109/60 37.6 °C (99.6 °F) Oral 76 19 93 %   21 1623 113/63 37.2 °C (98.9 °F) Oral 75 20 --   21 1136 (!) 103/51 37.4 °C (99.3 °F) Oral 68 20 95  "%   21 0800 -- -- -- 83 -- --   21 0445 (!) 151/83 (!) 39.4 °C (103 °F) Oral (!) 103 (!) 21 93 %   21 0400 -- -- -- 93 -- --   21 0000 (!) 147/96 -- -- 81 20 94 %   04/10/21 2122 -- (!) 39.5 °C (103.1 °F) -- -- -- --   04/10/21 2000 118/61 -- -- 91 20 97 %   04/10/21 1517 127/66 37.8 °C (100.1 °F) Oral 86 20 --       Temp (72hrs), Av.5 °C (99.5 °F), Min:36.6 °C (97.9 °F), Max:39.5 °C (103.1 °F)      Physical Exam:    Visit Vitals  /61 (BP Location: Left upper arm, Patient Position: Lying)   Pulse 66   Temp 37 °C (98.6 °F) (Oral)   Resp 20   Ht 1.676 m (5' 6\")   Wt 99.8 kg (220 lb)   LMP  (LMP Unknown)   SpO2 95%   BMI 35.51 kg/m²     General appearance: alert, appears stated age, cooperative and no distress  Eyes: anicteric  Lungs: crackles bilateral and diminished, nonlabored, intermittent dry cough, no rhonchi or wheezes  Abdomen: soft, ND, NT  Neurologic: Mental status: Alert, oriented, thought content appropriate    Lines, Drains, Airways, Wounds:  Peripheral IV 21 Right Forearm (Active)   Number of days: 4       Labs:    CBC Results       21                    0507 0535 0619         WBC 4.31 4.07 4.37         RBC 4.53 4.27 4.26         HGB 14.5 13.5 13.5         HCT 42.9 40.0 39.5         MCV 94.7 93.7 92.7         MCH 32.0 31.6 31.7         MCHC 33.8 33.8 34.2          159 130         Comment for PLT at 0619 on 21: RESULTS CHECKED. SPECIMEN QUALITY CHECKED      BMP Results       21                    0508 0535 0619          137 134         K 4.1 4.0 4.0         Cl 102 103 101         CO2 29 24 23         Glucose 210 194 250         BUN 25 25 23         Creatinine 0.8 0.7 0.9         Calcium 9.1 8.5 8.6         Anion Gap 9 10 10         EGFR >60.0 >60.0 >60.0                   PT/PTT Results       21           PT 12.8           INR 1.0           PTT 36           Comment " for INR at 1956 on 04/09/21: INR has no defined significance when PT is within Reference Range.    Comment for PTT at 1956 on 04/09/21: The Standard Therapeutic Range for Heparin is 68 to 101 seconds.      UA Results    No lab values to display.     Lactate Results    No lab values to display.         Microbiology Results     Procedure Component Value Units Date/Time    Blood Culture Blood, Venous [[id number]] Collected: 04/11/21 1046    Specimen: Blood, Venous Updated: 04/12/21 1501     Culture No growth at 18-24 hours    Blood Culture Blood, Venous [[id number]] Collected: 04/11/21 1046    Specimen: Blood, Venous Updated: 04/12/21 1501     Culture No growth at 18-24 hours          Pathology Results     ** No results found for the last 720 hours. **          Echo:         Imaging:    Radiology Imaging    XR CHEST 1 VW    Narrative  CLINICAL HISTORY: Covid 19 positive.    COMMENT: Upright portable view of the chest was obtained at 1808 hours. No  similar prior studies are available for comparison.    Patchy airspace opacities are noted in the lungs bilaterally, right greater than  left.  There is no pleural effusion or pneumothorax.  The heart is top normal in  size.  There are multilevel thoracic spine degenerative changes and there are  right greater than left shoulder joint degenerative changes.    --    Impression  Multilobar pneumonia, consistent with the provided history of Covid  19.      Assessment     COVID 19 PNA with acute hypoxic respiratory failure  - on 15 liters salter NC  - afebrile  - no leukocytosis  - on steroids and remdesivir  - renal and liver function stable   - blood sterile           Plan     Continue remdesivir therapy with daily labs, steroids and supportive care, assisted the patient to modified proning position and encouraged more frequent proning and incentive spirometer use, will order quantiferon in case experimental therapy warrented

## 2021-04-13 NOTE — PROGRESS NOTES
Hospital Medicine Service -  Daily Progress Note       SUBJECTIVE   Interval History:   Patient seen and examined at bed side this am   Her cough is not helping her to use incentive spirometry , was exhausted with minimal movement within the room and oxygen need has been increased to 13-15 liter this am .     OBJECTIVE      Vital signs in last 24 hours:  Temp:  [36.6 °C (97.9 °F)-37.2 °C (98.9 °F)] 37 °C (98.6 °F)  Heart Rate:  [64-71] 66  Resp:  [18-20] 20  BP: ()/(49-79) 112/66  No intake or output data in the 24 hours ending 04/13/21 1516    PHYSICAL EXAMINATION      Physical Exam    HEENT : Normocephalic / vision intact .  LUNGS : Decreased air entry in both lungs   HEART : Regular / no Murmur   ABDOMEN : Soft / non tender / audible BS   EXTREMITY :  No edema   MUSCULOSKELETAL : No deformity   CNS : AAO X 3 , moving all limbs equally   BEHAVIORAL : Co-operative   SKIN .: No erythema / rash / ulcer      LINES, CATHETERS, DRAINS, AIRWAYS, AND WOUNDS   Lines, Drains, and Airways:  Wounds (agree with documentation and present on admission):  Peripheral IV 04/09/21 Right Forearm (Active)   Number of days: 3         Comments:      LABS / IMAGING / TELE      Labs  Renal function /LFT and Lytes normal     No results found for: COVID19    Imaging  CXR  On admission - with multifocal pneumonia     ECG/Telemetry  NSR     ASSESSMENT AND PLAN      * Pneumonia due to COVID-19 virus  Assessment & Plan  With associated hypoxia, the patient saturation is 95% on 2L/min supplemental oxygen.  Found to be COVID-19 positive on 4/9 , her  already has it.    IV Decadron, prn Albuterol.  Pulmonary and ID are following     Further details in acute respiratory failure with hypoxia section.    Sepsis (CMS/HCC)  Assessment & Plan  Patient with sepsis, secondary to COVID-19 pneumonia.  Draw blood cultures.  Follow up on blood cultures.  ID following.    Acute respiratory failure with hypoxia (CMS/HCC)  Assessment &  Plan  Acute respiratory failure with hypoxia, currently requiring 4 L of oxygen via nasal cannula to keep oxygen levels above 88.    This is secondary to COVID-19 pneumonia.  Continue pulmonary toileting, incentive spirometry, frequent position shifts.     Remdesivir initiated D # 4, D # 5 of IV dexamethasone   Continue 0.5mg/kg of Lovenox given vascular complications of Covid.  Started on Dulera for intractable cough   Increasing hypoxia today needing nonheated high flow at 13-15 liter with oxygen saturation 93-94 %   Continue to titrate oxygen to keep sat >92 %   Decadron 6 mg IV, 10-day course.or once hypoxia resolves   Home oxygen eval prior to discharge .      Fibromyalgia  Assessment & Plan  Continue Lyrica.    Type 2 diabetes mellitus, without long-term current use of insulin (CMS/[facility])  Assessment & Plan      HbA1c of 8.5.    Holding oral med while on IV decadron and adjusted dose of basal bolus insulin with correction scale .       VTE Assessment: Padua VTE Score: 2  VTE Prophylaxis:  Current anticoagulants:  enoxaparin (LOVENOX) syringe 50 mg, subcutaneous, q12h (6a, 6p)      Code Status: Full Code  Palliative Care Screening Score: 0   Estimated Discharge Date: 4/16/2021   Disposition Planning: depends on clinical progress / will consult PT -OT      Delia Costello MD  4/13/2021

## 2021-04-14 ENCOUNTER — APPOINTMENT (INPATIENT)
Dept: RADIOLOGY | Facility: HOSPITAL | Age: 61
DRG: 871 | End: 2021-04-14
Attending: INTERNAL MEDICINE
Payer: COMMERCIAL

## 2021-04-14 LAB
ALBUMIN SERPL-MCNC: 3.3 G/DL (ref 3.4–5)
ALP SERPL-CCNC: 53 IU/L (ref 35–126)
ALT SERPL-CCNC: 26 IU/L (ref 11–54)
ANION GAP SERPL CALC-SCNC: 13 MEQ/L (ref 3–15)
AST SERPL-CCNC: 32 IU/L (ref 15–41)
BASOPHILS # BLD: 0.02 K/UL (ref 0.01–0.1)
BASOPHILS NFR BLD: 0.3 %
BILIRUB SERPL-MCNC: 0.8 MG/DL (ref 0.3–1.2)
BUN SERPL-MCNC: 23 MG/DL (ref 8–20)
CALCIUM SERPL-MCNC: 8.8 MG/DL (ref 8.9–10.3)
CHLORIDE SERPL-SCNC: 103 MEQ/L (ref 98–109)
CO2 SERPL-SCNC: 22 MEQ/L (ref 22–32)
CREAT SERPL-MCNC: 0.8 MG/DL (ref 0.6–1.1)
DIFFERENTIAL METHOD BLD: ABNORMAL
EOSINOPHIL # BLD: 0.01 K/UL (ref 0.04–0.36)
EOSINOPHIL NFR BLD: 0.2 %
ERYTHROCYTE [DISTWIDTH] IN BLOOD BY AUTOMATED COUNT: 12.2 % (ref 11.7–14.4)
GFR SERPL CREATININE-BSD FRML MDRD: >60 ML/MIN/1.73M*2
GLUCOSE BLD-MCNC: 135 MG/DL (ref 70–99)
GLUCOSE BLD-MCNC: 241 MG/DL (ref 70–99)
GLUCOSE BLD-MCNC: 248 MG/DL (ref 70–99)
GLUCOSE BLD-MCNC: 320 MG/DL (ref 70–99)
GLUCOSE SERPL-MCNC: 138 MG/DL (ref 70–99)
HCT VFR BLDCO AUTO: 39 % (ref 35–45)
HGB BLD-MCNC: 13.3 G/DL (ref 11.8–15.7)
IMM GRANULOCYTES # BLD AUTO: 0.07 K/UL (ref 0–0.08)
IMM GRANULOCYTES NFR BLD AUTO: 1.1 %
LYMPHOCYTES # BLD: 1.58 K/UL (ref 1.2–3.5)
LYMPHOCYTES NFR BLD: 24.7 %
MCH RBC QN AUTO: 31.7 PG (ref 28–33.2)
MCHC RBC AUTO-ENTMCNC: 34.1 G/DL (ref 32.2–35.5)
MCV RBC AUTO: 93.1 FL (ref 83–98)
MONOCYTES # BLD: 0.53 K/UL (ref 0.28–0.8)
MONOCYTES NFR BLD: 8.3 %
NEUTROPHILS # BLD: 4.19 K/UL (ref 1.7–7)
NEUTS SEG NFR BLD: 65.4 %
NRBC BLD-RTO: 0 %
PDW BLD AUTO: 10.4 FL (ref 9.4–12.3)
PLATELET # BLD AUTO: 224 K/UL (ref 150–369)
POCT TEST: ABNORMAL
POTASSIUM SERPL-SCNC: 3.6 MEQ/L (ref 3.6–5.1)
PROT SERPL-MCNC: 6 G/DL (ref 6–8.2)
RBC # BLD AUTO: 4.19 M/UL (ref 3.93–5.22)
SODIUM SERPL-SCNC: 138 MEQ/L (ref 136–144)
WBC # BLD AUTO: 6.4 K/UL (ref 3.8–10.5)

## 2021-04-14 PROCEDURE — 93970 EXTREMITY STUDY: CPT

## 2021-04-14 PROCEDURE — 97166 OT EVAL MOD COMPLEX 45 MIN: CPT | Mod: GO

## 2021-04-14 PROCEDURE — 63600000 HC DRUGS/DETAIL CODE: Performed by: HOSPITALIST

## 2021-04-14 PROCEDURE — 20600000 HC ROOM AND CARE INTERMEDIATE/TELEMETRY

## 2021-04-14 PROCEDURE — 25000000 HC PHARMACY GENERAL: Performed by: INTERNAL MEDICINE

## 2021-04-14 PROCEDURE — 63600000 HC DRUGS/DETAIL CODE: Mod: JW | Performed by: HOSPITALIST

## 2021-04-14 PROCEDURE — 63700000 HC SELF-ADMINISTRABLE DRUG: Performed by: INTERNAL MEDICINE

## 2021-04-14 PROCEDURE — 25800000 HC PHARMACY IV SOLUTIONS: Performed by: INTERNAL MEDICINE

## 2021-04-14 PROCEDURE — 99232 SBSQ HOSP IP/OBS MODERATE 35: CPT | Mod: CR | Performed by: HOSPITALIST

## 2021-04-14 PROCEDURE — 63700000 HC SELF-ADMINISTRABLE DRUG: Performed by: HOSPITALIST

## 2021-04-14 PROCEDURE — 36415 COLL VENOUS BLD VENIPUNCTURE: CPT | Performed by: NURSE PRACTITIONER

## 2021-04-14 PROCEDURE — 80053 COMPREHEN METABOLIC PANEL: CPT | Performed by: INTERNAL MEDICINE

## 2021-04-14 PROCEDURE — 86480 TB TEST CELL IMMUN MEASURE: CPT | Performed by: NURSE PRACTITIONER

## 2021-04-14 PROCEDURE — 97162 PT EVAL MOD COMPLEX 30 MIN: CPT | Mod: GP

## 2021-04-14 PROCEDURE — 85025 COMPLETE CBC W/AUTO DIFF WBC: CPT | Performed by: INTERNAL MEDICINE

## 2021-04-14 PROCEDURE — 71045 X-RAY EXAM CHEST 1 VIEW: CPT

## 2021-04-14 RX ORDER — INSULIN GLARGINE 100 [IU]/ML
18 INJECTION, SOLUTION SUBCUTANEOUS DAILY
Status: DISCONTINUED | OUTPATIENT
Start: 2021-04-14 | End: 2021-04-17 | Stop reason: HOSPADM

## 2021-04-14 RX ORDER — SODIUM CHLORIDE AND POTASSIUM CHLORIDE 150; 900 MG/100ML; MG/100ML
INJECTION, SOLUTION INTRAVENOUS CONTINUOUS
Status: ACTIVE | OUTPATIENT
Start: 2021-04-14 | End: 2021-04-14

## 2021-04-14 RX ORDER — INSULIN ASPART 100 [IU]/ML
8 INJECTION, SOLUTION INTRAVENOUS; SUBCUTANEOUS
Status: DISCONTINUED | OUTPATIENT
Start: 2021-04-14 | End: 2021-04-17 | Stop reason: HOSPADM

## 2021-04-14 RX ADMIN — CITALOPRAM HYDROBROMIDE 5 MG: 10 TABLET ORAL at 09:01

## 2021-04-14 RX ADMIN — ACETAMINOPHEN 650 MG: 325 TABLET ORAL at 17:48

## 2021-04-14 RX ADMIN — OXYCODONE HYDROCHLORIDE AND ACETAMINOPHEN 500 MG: 500 TABLET ORAL at 17:38

## 2021-04-14 RX ADMIN — OXYCODONE HYDROCHLORIDE AND ACETAMINOPHEN 500 MG: 500 TABLET ORAL at 09:01

## 2021-04-14 RX ADMIN — ATORVASTATIN CALCIUM 20 MG: 20 TABLET, FILM COATED ORAL at 22:03

## 2021-04-14 RX ADMIN — INSULIN ASPART 4 UNITS: 100 INJECTION, SOLUTION INTRAVENOUS; SUBCUTANEOUS at 22:04

## 2021-04-14 RX ADMIN — GUAIFENESIN 600 MG: 600 TABLET, EXTENDED RELEASE ORAL at 09:01

## 2021-04-14 RX ADMIN — INSULIN ASPART 8 UNITS: 100 INJECTION, SOLUTION INTRAVENOUS; SUBCUTANEOUS at 12:50

## 2021-04-14 RX ADMIN — DEXAMETHASONE SODIUM PHOSPHATE 6 MG: 4 INJECTION, SOLUTION INTRA-ARTICULAR; INTRALESIONAL; INTRAMUSCULAR; INTRAVENOUS; SOFT TISSUE at 09:01

## 2021-04-14 RX ADMIN — GUAIFENESIN 600 MG: 600 TABLET, EXTENDED RELEASE ORAL at 19:45

## 2021-04-14 RX ADMIN — INSULIN ASPART 8 UNITS: 100 INJECTION, SOLUTION INTRAVENOUS; SUBCUTANEOUS at 17:38

## 2021-04-14 RX ADMIN — REMDESIVIR 100 MG: 100 INJECTION, POWDER, LYOPHILIZED, FOR SOLUTION INTRAVENOUS at 16:04

## 2021-04-14 RX ADMIN — ENOXAPARIN SODIUM 50 MG: 60 INJECTION SUBCUTANEOUS at 17:38

## 2021-04-14 RX ADMIN — INSULIN GLARGINE 18 UNITS: 100 INJECTION, SOLUTION SUBCUTANEOUS at 09:03

## 2021-04-14 RX ADMIN — PREGABALIN 150 MG: 75 CAPSULE ORAL at 19:45

## 2021-04-14 RX ADMIN — Medication 2000 UNITS: at 17:38

## 2021-04-14 RX ADMIN — INSULIN ASPART 4 UNITS: 100 INJECTION, SOLUTION INTRAVENOUS; SUBCUTANEOUS at 12:49

## 2021-04-14 RX ADMIN — MOMETASONE FUROATE AND FORMOTEROL FUMARATE DIHYDRATE 2 PUFF: 200; 5 AEROSOL RESPIRATORY (INHALATION) at 17:38

## 2021-04-14 RX ADMIN — INSULIN ASPART 8 UNITS: 100 INJECTION, SOLUTION INTRAVENOUS; SUBCUTANEOUS at 17:40

## 2021-04-14 RX ADMIN — BENZONATATE 100 MG: 100 CAPSULE ORAL at 00:08

## 2021-04-14 RX ADMIN — ENOXAPARIN SODIUM 50 MG: 60 INJECTION SUBCUTANEOUS at 05:50

## 2021-04-14 RX ADMIN — PREGABALIN 150 MG: 75 CAPSULE ORAL at 09:01

## 2021-04-14 RX ADMIN — INSULIN ASPART 8 UNITS: 100 INJECTION, SOLUTION INTRAVENOUS; SUBCUTANEOUS at 09:02

## 2021-04-14 RX ADMIN — POTASSIUM CHLORIDE AND SODIUM CHLORIDE: 900; 150 INJECTION, SOLUTION INTRAVENOUS at 09:12

## 2021-04-14 RX ADMIN — MOMETASONE FUROATE AND FORMOTEROL FUMARATE DIHYDRATE 2 PUFF: 200; 5 AEROSOL RESPIRATORY (INHALATION) at 05:52

## 2021-04-14 ASSESSMENT — COGNITIVE AND FUNCTIONAL STATUS - GENERAL
CLIMB 3 TO 5 STEPS WITH RAILING: 3 - A LITTLE
AFFECT: WFL
HELP NEEDED FOR PERSONAL GROOMING: 4 - NONE
TOILETING: 4 - NONE
STANDING UP FROM CHAIR USING ARMS: 4 - NONE
DRESSING REGULAR LOWER BODY CLOTHING: 3 - A LITTLE
EATING MEALS: 4 - NONE
MOVING TO AND FROM BED TO CHAIR: 4 - NONE
DRESSING REGULAR UPPER BODY CLOTHING: 4 - NONE
AFFECT: WFL
WALKING IN HOSPITAL ROOM: 3 - A LITTLE
HELP NEEDED FOR BATHING: 3 - A LITTLE

## 2021-04-14 NOTE — HOSPITAL COURSE
Maylin Jarvis is a 60 y.o. female admitted on 4/9/2021 with Hypoxia [R09.02]  Pneumonia due to COVID-19 virus [U07.1, J12.82]. Principal problem is Pneumonia due to COVID-19 virus.    Past Medical History  Maylin Jarvis has a past medical history of Depression with anxiety, Diabetes mellitus (CMS/HCC), and Fibromyalgia.    History of Present Illness   Pt admit to ER with cough, myalgias, fever, fatigue; COVID-19 test is POSITIVE. Her CXR shows B/L small infiltrates

## 2021-04-14 NOTE — PROGRESS NOTES
I spoke to pt , Dajuan, to offer emotional/spiritual support. I provided supportive listening and empathized with his experience. He talked about his experience with covid and his feelings about his wife now struggling with it. I prayed with him.     Lissy Mendoza  Spiritual Care Intern  x [phone]

## 2021-04-14 NOTE — PLAN OF CARE
Problem: Adult Inpatient Plan of Care  Goal: Plan of Care Review  Outcome: Progressing  Flowsheets (Taken 4/14/2021 1524)  Progress: improving  Plan of Care Reviewed With: patient  Outcome Summary:   PT eval completed   expect steady progress provided respiratory status remains stable. Expect to DC from acute care PT in 1-2 tx sessions.     Problem: Mobility Impairment  Goal: Optimal Mobility  Outcome: Progressing     Problem: Adult Inpatient Plan of Care  Goal: Patient-Specific Goal (Individualization)  Flowsheets (Taken 4/14/2021 1524)  Patient-Specific Goals (Include Timeframe): to breath better

## 2021-04-14 NOTE — PROGRESS NOTES
Patient: Maylin Stiles  Location: [address]  MRN: [id number]  Today's date: 4/14/2021  Patient left in bedside chair with call bell and personal items accessible    Maylin Jarvis is a 60 y.o. female admitted on 4/9/2021 with Hypoxia [R09.02]  Pneumonia due to COVID-19 virus [U07.1, J12.82]. Principal problem is Pneumonia due to COVID-19 virus.    Past Medical History  Maylin Jarvis has a past medical history of Depression with anxiety, Diabetes mellitus (CMS/HCC), and Fibromyalgia.    History of Present Illness   TO ER with Cough, myalgias, fever, fatigue. her COVID-19 test is POSITIVE. Her CXR shows B/L small infiltrates           OT Vitals    Date/Time Pulse SpO2 BP [facility]   04/14/21 1335 72 97 % 109/60 GORDON      OT Pain    Date/Time Pain Type Pref Pain Scale Location Rating: Rest [facility]   04/14/21 1335 Pain Assessment number (Numeric Rating Pain Scale) 0 0 GORDON          Prior Living Environment      Most Recent Value   People in Home  spouse, child(kannan), adult   Living Arrangements  house   Current Living Arrangements  home/apartment/condo   Home Accessibility  stairs to enter home (Group), stairs within home (Group)   Number of Stairs, Main Entrance  2   Number of Stairs, Within Home, Primary  12          Prior Level of Function      Most Recent Value   Dominant Hand  right   Ambulation  independent   Transferring  independent   Toileting  independent   Bathing  independent   Dressing  independent   Eating  independent   Prior Level of Function Comment  indep with ADL's and mobility without AD   Assistive Device/Animal Currently Used at Home  none          Occupational Profile      Most Recent Value   Occupational History/Life Experiences  homemaker          OT Evaluation and Treatment - 04/14/21 1453        OT Time Calculation    Start Time  1335     Stop Time  1400     Time Calculation (min)  25 min        Session Details    Document Type  initial evaluation     Mode of Treatment  occupational  therapy        General Information    Patient Profile Reviewed  yes     Onset of Illness/Injury or Date of Surgery  04/09/21     Referring Physician  Trang     General Observations of Patient  pt rec'd sitting on EOB     Existing Precautions/Restrictions  contact;droplet        Cognition/Psychosocial    Affect/Mental Status (Cognition)  WFL     Orientation Status (Cognition)  oriented x 4     Follows Commands (Cognition)  WFL     Cognitive Function  WFL        Vision Assessment/Intervention    Visual Impairment/Limitations  WFL        Range of Motion (ROM)    Range of Motion  ROM is WFL;bilateral upper extremities        Strength (Manual Muscle Testing)    Strength (Manual Muscle Testing)  strength is WFL;bilateral upper extremities        Bed Mobility    [place], Supine to Sit  independent     [place], Sit to Supine  independent        Sit to Stand Transfer    [place], Sit to Stand Transfer  independent     Verbal Cues  safety     Assistive Device  none        Stand to Sit Transfer    [place], Stand to Sit Transfer  independent     Verbal Cues  safety     Assistive Device  none        Balance    Static Sitting Balance  WFL     Dynamic Sitting Balance  WFL     Static Standing Balance  WFL     Dynamic Standing Balance  WFL        Bathing    Comment  set up with sponge bathing supplies        Upper Body Dressing    Comment  NO difficulties expected        Lower Body Dressing    Comment  No difficulties expected        Grooming    Self-Performance  washes, rinses and dries face;oral care (brushing teeth, cleaning dentures)     [place]  independent        Self-Feeding    [place]  independent        AM-PAC (TM) - ADL (Current Function)    Putting on and taking off regular lower body clothing?  3 - A Little     Bathing?  3 - A Little     Toileting?  4 - None     Putting on/taking off regular upper body clothing?  4 - None     How much help for taking care of personal grooming?  4 - None      Eating meals?  4 - None     AM-PAC (TM) ADL Score  22        Therapy Assessment/Plan (OT)    Rehab Potential (OT)  good, to achieve stated therapy goals     Therapy Frequency (OT)  3-5 times/wk        Progress Summary (OT)    Daily Outcome Statement (OT)  Patient presents with decreased activity tolerance due to COVID. Continue OT to improve endurance during ADLs        Therapy Plan Review/Discharge Plan (OT)    OT Recommended Discharge Disposition  home     Anticipated Equipment Needs At Discharge (OT)  none                  Education provided this session. See the Patient Education summary report for full details.         OT Goals      Most Recent Value   Transfer Goal 1   Activity/Assistive Device  toilet at 04/14/2021 1453   [place]  independent at 04/14/2021 1453   Time Frame  by discharge at 04/14/2021 1453   Progress/Outcome  goal ongoing at 04/14/2021 1453   Dressing Goal 1   Activity/Adaptive Equipment  dressing skills, all at 04/14/2021 1453   [place]  independent at 04/14/2021 1453   Time Frame  by discharge at 04/14/2021 1453   Progress/Outcome  goal ongoing at 04/14/2021 1453   Problem Specific Goal 1   Problem-Specific Goal 1  increase activity tolerance during self cares with O2 sat above 90% at 04/14/2021 1453   Time Frame  by discharge at 04/14/2021 1453   Progress/Outcome  goal ongoing at 04/14/2021 1453

## 2021-04-14 NOTE — PROGRESS NOTES
Hospital Medicine Service -  Daily Progress Note       SUBJECTIVE   Interval History:   Patient seen and examined at bed side this am   She is feeling little better today   Mild hypokalemia - s/p replacement   No over night event noted .     OBJECTIVE      Vital signs in last 24 hours:  Temp:  [36.6 °C (97.9 °F)-37.3 °C (99.2 °F)] 36.7 °C (98 °F)  Heart Rate:  [60-87] 71  Resp:  [20] 20  BP: ()/(58-74) 109/59  No intake or output data in the 24 hours ending 04/14/21 0255    PHYSICAL EXAMINATION      Physical Exam    HEENT : Normocephalic / vision intact .  LUNGS : Decreased air entry in both lungs   HEART : Regular / no Murmur   ABDOMEN : Soft / non tender / audible BS   EXTREMITY :  No edema   MUSCULOSKELETAL : No deformity   CNS : AAO X 3 , moving all limbs equally   BEHAVIORAL : Co-operative   SKIN .: No erythema / rash / ulcer      LINES, CATHETERS, DRAINS, AIRWAYS, AND WOUNDS   Lines, Drains, and Airways:  Wounds (agree with documentation and present on admission):  Peripheral IV 04/09/21 Right Forearm (Active)   Number of days: 3         Comments:      LABS / IMAGING / TELE      Labs  Potassium 3.6     No results found for: COVID19    Imaging  CXR  On admission - with multifocal pneumonia     ECG/Telemetry  NSR     ASSESSMENT AND PLAN      * Pneumonia due to COVID-19 virus  Assessment & Plan  With associated hypoxia, the patient saturation is 95% on 2L/min supplemental oxygen.  Found to be COVID-19 positive on 4/9 , her  already has it.    IV Decadron, prn Albuterol.  Pulmonary and ID are following     Further details in acute respiratory failure with hypoxia section.    Sepsis (CMS/HCC)  Assessment & Plan  Patient with sepsis, secondary to COVID-19 pneumonia.  Draw blood cultures.  Follow up on blood cultures.  ID following.    Acute respiratory failure with hypoxia (CMS/HCC)  Assessment & Plan  Acute respiratory failure with hypoxia, currently requiring 4 L of oxygen via nasal cannula to keep  oxygen levels above 88.    This is secondary to COVID-19 pneumonia.  Continue pulmonary toileting, incentive spirometry, frequent position shifts.     Remdesivir initiated D # 5/5  D # 6 of IV dexamethasone   Continue 0.5mg/kg of Lovenox given vascular complications of Covid.  Started on Dulera for intractable cough   Increasing hypoxia today needing nonheated high flow at 13-15 liter with oxygen saturation 93-94 %   Continue to titrate oxygen to keep sat >92 %   Decadron 6 mg IV, 10-day course.or once hypoxia resolves    Started to wean off from oxygen on 4/14 - monitor   Home oxygen eval prior to discharge .      Fibromyalgia  Assessment & Plan  Continue Lyrica.    Type 2 diabetes mellitus, without long-term current use of insulin (CMS/[facility])  Assessment & Plan      HbA1c of 8.5  Uncontrolled BG while on IV steroids   Insulin adjusted again on 4/14  Holding oral med for now        VTE Assessment: Padua VTE Score: 2  VTE Prophylaxis:  Current anticoagulants:  enoxaparin (LOVENOX) syringe 50 mg, subcutaneous, q12h (6a, 6p)      Code Status: Full Code  Palliative Care Screening Score: 0   Estimated Discharge Date: 4/16/2021   Disposition Planning: depends on clinical progress      Delia Costello MD  4/14/2021

## 2021-04-14 NOTE — PLAN OF CARE
Problem: Adult Inpatient Plan of Care  Goal: Plan of Care Review  Flowsheets (Taken 4/14/2021 1514)  Plan of Care Reviewed With: patient  Outcome Summary: Patient presents with decreased activity tolerance due to COVID. Continue OT to improve endurance during ADLs     Problem: Adult Inpatient Plan of Care  Goal: Patient-Specific Goal (Individualization)  Flowsheets (Taken 4/14/2021 1514)  Patient-Specific Goals (Include Timeframe): to return to home     Problem: Self-Care Deficit  Goal: Improved Ability to Complete Activities of Daily Living  Outcome: Progressing

## 2021-04-14 NOTE — PROGRESS NOTES
Infectious Disease Progress Note    Patient Name: Maylin Stiles  MR#: [id number]  : 1960  Admission Date: 2021  Date: 21   Time: 10:58 AM   Author: ANJELICA Glass    Major Events:     Antibiotics:    Anti-infectives (From admission, onward)    Start     Dose/Rate Route Frequency Ordered Stop    21 1409  remdesivir (VEKLURY) 100 mg in sodium chloride 0.9 % 250 mL IVPB      100 mg  500 mL/hr over 30 Minutes intravenous Every 24 hours interval 04/10/21 1325 04/15/21 1414          Subjective   Patient states she is only having mild back pain, states her breathing is getting a little better, still coughing and able to bring up tiny amount of sputum last night, tolerating diet, states she is proning and was able to lay on her stomach which helped her breathing, admits to using incentive spirometer, d/w nursing     Review of Systems    Pertinent items are noted in HPI.    Objective     Vital Signs:    Patient Vitals for the past 72 hrs:   BP Temp Temp src Pulse Resp SpO2   21 0855 -- -- -- -- -- 92 %   21 0844 113/62 37.3 °C (99.2 °F) Oral 62 20 95 %   21 0400 -- -- -- 87 -- --   21 0000 (!) 99/58 36.8 °C (98.2 °F) Oral 60 20 92 %   21 2030 122/74 36.8 °C (98.2 °F) Oral -- 20 95 %   21 -- -- -- 64 -- --   21 1507 112/66 37 °C (98.6 °F) Oral 66 20 94 %   21 1451 109/66 37.1 °C (98.8 °F) Oral 66 -- 94 %   21 1437 107/61 37.1 °C (98.7 °F) Oral 67 -- 94 %   21 1420 99/61 37 °C (98.6 °F) Oral 67 20 96 %   21 1200 107/61 37 °C (98.6 °F) Oral 66 20 95 %   21 0836 128/79 36.8 °C (98.3 °F) Axillary 64 20 93 %   21 0400 119/67 36.9 °C (98.5 °F) Oral 67 20 95 %   21 0020 -- -- -- -- -- 94 %   21 0000 130/73 36.6 °C (97.9 °F) Oral 65 20 94 %   21 114/74 36.8 °C (98.3 °F) Oral 68 20 95 %   21 -- -- -- -- -- 95 %   21 -- -- -- 71 -- --   21 -- -- -- 64 -- --  "  21 1547 (!) 100/49 37.2 °C (98.9 °F) Oral 66 18 (!) 91 %   21 1200 -- -- -- 80 -- --   21 1158 103/62 37.1 °C (98.7 °F) Oral 67 18 95 %   21 1000 -- -- -- 70 -- --   21 0841 101/63 37.3 °C (99.1 °F) Oral 69 18 96 %   21 0800 -- -- -- 67 -- --   21 0500 110/65 37.9 °C (100.3 °F) Oral 73 16 93 %   21 0400 -- -- -- 70 -- --   21 0000 -- -- -- 69 -- --   21 2311 114/69 37.4 °C (99.3 °F) Oral 72 16 93 %   21 2000 109/60 37.6 °C (99.6 °F) Oral 76 19 93 %   21 1623 113/63 37.2 °C (98.9 °F) Oral 75 20 --   21 1136 (!) 103/51 37.4 °C (99.3 °F) Oral 68 20 95 %       Temp (72hrs), Av.1 °C (98.8 °F), Min:36.6 °C (97.9 °F), Max:37.9 °C (100.3 °F)      Physical Exam:    Visit Vitals  /62 (BP Location: Right upper arm, Patient Position: Lying)   Pulse 62   Temp 37.3 °C (99.2 °F) (Oral)   Resp 20   Ht 1.676 m (5' 6\")   Wt 99.8 kg (220 lb)   LMP  (LMP Unknown)   SpO2 92%   BMI 35.51 kg/m²     General appearance: alert, appears stated age, cooperative and no distress  Eyes: anicteric  Lungs: diminished breath sounds bilateral and faint crackles bilaterally, infrequent dry cough, no rhonchi or wheezes, nonlabored  Abdomen: soft, ND, NT  Neurologic: Mental status: Alert, oriented, thought content appropriate    Lines, Drains, Airways, Wounds:  Peripheral IV 21 Right Forearm (Active)   Number of days: 5       Labs:    CBC Results       21                    0542 0507 0535         WBC 6.40 4.31 4.07         RBC 4.19 4.53 4.27         HGB 13.3 14.5 13.5         HCT 39.0 42.9 40.0         MCV 93.1 94.7 93.7         MCH 31.7 32.0 31.6         MCHC 34.1 33.8 33.8          208 159                   BMP Results       21                    0542 0508 0535          140 137         K 3.6 4.1 4.0         Cl 103 102 103         CO2 22 29 24         Glucose 138 210 194         BUN 23 25 25      "    Creatinine 0.8 0.8 0.7         Calcium 8.8 9.1 8.5         Anion Gap 13 9 10         EGFR >60.0 >60.0 >60.0                   PT/PTT Results       04/09/21 1956           PT 12.8           INR 1.0           PTT 36           Comment for INR at 1956 on 04/09/21: INR has no defined significance when PT is within Reference Range.    Comment for PTT at 1956 on 04/09/21: The Standard Therapeutic Range for Heparin is 68 to 101 seconds.      UA Results    No lab values to display.     Lactate Results    No lab values to display.         Microbiology Results     Procedure Component Value Units Date/Time    Blood Culture Blood, Venous [[id number]] Collected: 04/11/21 1046    Specimen: Blood, Venous Updated: 04/13/21 1501     Culture No growth at 48 hours    Blood Culture Blood, Venous [[id number]] Collected: 04/11/21 1046    Specimen: Blood, Venous Updated: 04/13/21 1501     Culture No growth at 48 hours          Pathology Results     ** No results found for the last 720 hours. **          Echo:         Imaging:    Radiology Imaging    XR CHEST 1 VW    Narrative  CLINICAL HISTORY: Covid 19 positive.    COMMENT: Upright portable view of the chest was obtained at 1808 hours. No  similar prior studies are available for comparison.    Patchy airspace opacities are noted in the lungs bilaterally, right greater than  left.  There is no pleural effusion or pneumothorax.  The heart is top normal in  size.  There are multilevel thoracic spine degenerative changes and there are  right greater than left shoulder joint degenerative changes.    --    Impression  Multilobar pneumonia, consistent with the provided history of Covid  19.      Assessment     COVID 19 PNA with acute hypoxic respiratory failure  - on 10 liters salter [place]  - afebrile  - no leukocytosis  - on steroids and remdesivir  - renal and liver function stable   - blood sterile           Plan     Completing remdesivir therapy today, steroid mgmt per  primary team, continue supportive care

## 2021-04-14 NOTE — PROGRESS NOTES
Patient: Maylin Stiles  Location: [address]  MRN: [id number]  Today's date: 4/14/2021     Pt in bedside chair, personal items and call bell in reach. VSS and NAD. Nurse aware of titration of oxygen lower to 8L given success during tx session and no sxs.       Maylin Jarvis is a 60 y.o. female admitted on 4/9/2021 with Hypoxia [R09.02]  Pneumonia due to COVID-19 virus [U07.1, J12.82]. Principal problem is Pneumonia due to COVID-19 virus.    Past Medical History  Maylin Jarvis has a past medical history of Depression with anxiety, Diabetes mellitus (CMS/[facility]), and Fibromyalgia.    History of Present Illness   Pt admit to ER with cough, myalgias, fever, fatigue; COVID-19 test is POSITIVE. Her CXR shows B/L small infiltrates           PT Vitals    Date/Time Pulse SpO2 Pt Activity O2 Therapy O2 Del Method O2 Flow Rate BP Pt Position [facility]   04/14/21 1335 72 97 % At rest Supplemental Oxygen High flow 10 L/min 109/60 Sitting [facility]   04/14/21 1356 73 96 % At rest Supplemental oxygen High flow nasal cannula 8 L/min 110/61 Sitting [facility]      PT Pain    Date/Time Pain Type Pref Pain Scale Location Rating: Rest [facility]   04/14/21 1335 Pain Assessment number (Numeric Rating Pain Scale) 0 0 [facility]   04/14/21 1356 Pain Assessment number (Numeric Rating Pain Scale) 0 0 [facility]          Prior Living Environment      Most Recent Value   People in Home  spouse, child(kannan), adult   Living Arrangements  house   Current Living Arrangements  home/apartment/condo   Home Accessibility  stairs to enter home (Group), stairs within home (Group)   Number of Stairs, Main Entrance  2   Number of Stairs, Within Home, Primary  12          Prior Level of Function      Most Recent Value   Dominant Hand  right   Ambulation  independent   Transferring  independent   Toileting  independent   Bathing  independent   Dressing  independent   Eating  independent   Prior Level of Function Comment  indep with ADL's and mobility without AD   Assistive  Device/Animal Currently Used at Home  none          PT Evaluation and Treatment - 04/14/21 1357        PT Time Calculation    Start Time  1335     Stop Time  1357     Time Calculation (min)  22 min        Session Details    Document Type  initial evaluation     Mode of Treatment  physical therapy        General Information    Patient Profile Reviewed  yes     Existing Precautions/Restrictions  contact;droplet;fall;oxygen therapy device and L/min        Cognition/Psychosocial    Affect/Mental Status (Cognition)  WFL     Orientation Status (Cognition)  oriented x 4     Follows Commands (Cognition)  WFL     Cognitive Function  WFL        Sensory    Hearing Status  WFL        Vision Assessment/Intervention    Visual Impairment/Limitations  WFL        Sensory Assessment (Somatosensory)    Sensory Assessment (Somatosensory)  LE sensation intact        Range of Motion (ROM)    Range of Motion  ROM is WFL;bilateral lower extremities        Strength (Manual Muscle Testing)    Strength (Manual Muscle Testing)  strength is WFL;bilateral lower extremities        Bed Mobility    [place], Supine to Sit  independent     [place], Sit to Supine  independent        Sit to Stand Transfer    [place], Sit to Stand Transfer  independent        Stand to Sit Transfer    [place], Stand to Sit Transfer  independent        Gait Training    [place], Gait  supervision;verbal cues     Assistive Device  none     Distance in Feet  75 feet     Pattern (Gait)  step-through     Deviations/Abnormal Patterns (Gait)  stride length decreased;step length decreased     Comment (Gait/Stairs)  limited by high flow oxygen tubing lines; mild gait path deviation noted however no observable LOB.         Balance    Balance Assessment  sitting static balance;sitting dynamic balance;standing static balance;standing dynamic balance     Static Sitting Balance  WFL     Dynamic Sitting Balance  WFL     Static Standing Balance  WFL     Dynamic  Standing Balance  mild impairment        Therapeutic Exercise    Therapeutic Exercise  other (see comments)    LAQ, marching, and APs; 1x10 reps       AM-PAC (TM) - Mobility (Current Function)    Turning from your back to your side while in a flat bed without using bedrails?  4 - None     Moving from lying on your back to sitting on the side of a flat bed without using bedrails?  4 - None     Moving to and from a bed to a chair?  4 - None     Standing up from a chair using your arms?  4 - None     To walk in a hospital room?  3 - A Little     Climbing 3-5 steps with a railing?  3 - A Little     AM-PAC (TM) Mobility Score  22        Therapy Assessment/Plan (PT)    Rehab Potential (PT)  good, to achieve stated therapy goals     Therapy Frequency (PT)  3-5 times/wk     Planned Therapy Interventions (PT)  balance training;bed mobility training;gait training;home exercise program;patient/family education;stair training;strengthening;transfer training        Progress Summary (PT)    Daily Outcome Statement (PT)  PT eval completed; expect steady progress provided respiratory status remains stable. Expect to DC from acute care PT in 1-2 tx sessions.         Therapy Plan Review/Discharge Plan (PT)    PT Recommended Discharge Disposition  home with assist     Anticipated Equipment Needs at Discharge (PT)  none                       Education provided this session. See the Patient Education summary report for full details.    PT Goals      Most Recent Value   Gait Training Goal 1   Activity/Assistive Device  gait (walking locomotion), no assistive device at 04/14/2021 1357   [place]  independent at 04/14/2021 1357   Distance  100 at 04/14/2021 1357   Time Frame  by discharge at 04/14/2021 1357   Progress/Outcome  goal ongoing at 04/14/2021 1357   Stairs Goal 1   Activity/Assistive Device  ascending stairs, descending stairs, using handrail, left at 04/14/2021 1357   [place]  modified independence at 04/14/2021 1357    Number of Stairs  8 at 04/14/2021 1357   Time Frame  by discharge at 04/14/2021 1357   Progress/Outcome  goal ongoing at 04/14/2021 0799

## 2021-04-14 NOTE — PROGRESS NOTES
Pulmonary Progress Note      Subjective:   Patient seen and examined at the bedside this morning.  Overnight no acute events.  Patient's continues to have increased oxygen requirements 12 L/min.  She has been on Dulera for last 2 days and reports her cough has been getting better.  Denies chest pain, shortness of breath     Allergies: Patient has no known allergies.    Medications:  Current Facility-Administered Medications   Medication Dose Route Frequency Provider Last Rate Last Admin   • acetaminophen (TYLENOL) tablet 650 mg  650 mg oral q4h PRN Robby Norris MD   650 mg at 04/12/21 2001   • albuterol HFA (VENTOLIN HFA) 90 mcg/actuation inhaler 2 puff  2 puff inhalation q6h PRN Robby Norris MD   2 puff at 04/13/21 0928   • ascorbic acid (VITAMIN C) tablet 500 mg  500 mg oral BID with meals Fahad Mcmahan MD   500 mg at 04/14/21 0901   • atorvastatin (LIPITOR) tablet 20 mg  20 mg oral Nightly Robby Norris MD   20 mg at 04/13/21 2021   • benzonatate (TESSALON) capsule 100 mg  100 mg oral q4h PRN Delia Costello MD   100 mg at 04/14/21 0008   • butalbital-acetaminophen-caff (FIORICET, ESGIC) per tablet 1 tablet  1 tablet oral q6h PRN Fahad Mcmahan MD   1 tablet at 04/12/21 0520   • cholecalciferol (vitamin D3) tablet 2,000 Units  2,000 Units oral Daily with dinner Fahad Mcmahan MD   2,000 Units at 04/13/21 1603   • citalopram (celeXA) tablet 5 mg  5 mg oral Daily Robby Norris MD   5 mg at 04/14/21 0901   • dexamethasone (DECADRON) injection 6 mg  6 mg intravenous Daily Robby Norris MD   6 mg at 04/14/21 0901   • enoxaparin (LOVENOX) syringe 50 mg  50 mg subcutaneous q12h (6a, 6p) Robby Norris MD   50 mg at 04/14/21 0550   • guaiFENesin (MUCINEX) 12 hr ER tablet 600 mg  600 mg oral BID Fahad Mcmahan MD   600 mg at 04/14/21 0901   • insulin aspart U-100 (NovoLOG) pen 2-12 Units  2-12 Units subcutaneous With meals & nightly Delia Costello MD   4 Units at 04/14/21 1249   •  insulin aspart U-100 (NovoLOG) pen 8 Units  8 Units subcutaneous TID with meals Delia Costello MD   8 Units at 04/14/21 1250   • insulin glargine U-100 (LANTUS SOLOSTAR/BASAGLAR) pen 18 Units  18 Units subcutaneous Daily Delia Costello MD   18 Units at 04/14/21 0903   • mometasone-formoterol (DULERA 200) 200-5 mcg/actuation inhaler 2 puff  2 puff inhalation BID (6a, 6p) Mayra Hester MD   2 puff at 04/14/21 0552   • pregabalin (LYRICA) capsule 150 mg  150 mg oral BID Robby Norris MD   150 mg at 04/14/21 0901   • remdesivir (VEKLURY) 100 mg in sodium chloride 0.9 % 250 mL IVPB  100 mg intravenous q24h INT Abby Bolton MD   Stopped at 04/13/21 1508   • sodium chloride 0.9 % with KCl 20 mEq/L infusion   intravenous Continuous Delia Costello MD 75 mL/hr at 04/14/21 0912 New Bag at 04/14/21 0912      Review of Systems:  Unchanged except as noted in HPI    Vital signs in last 24 hours:  Temp:  [36.6 °C (97.9 °F)-37.3 °C (99.2 °F)] 36.6 °C (97.9 °F)  Heart Rate:  [60-87] 66  Resp:  [20] 20  BP: ()/(58-74) 107/61    Input/Ouput in Last 24 hours:  No intake or output data in the 24 hours ending 04/14/21 1440    Physical Exam  General: Well appearing female, NAD, on 12L low flow cannula  HEENT: Moist membranes, EOMI, PERRL, anicteric sclera   Neck: Supple, no JVD, no tug or stridor, trach midline  Cardiac: RRR, +S1/S2, no murmur appreciated  Lungs: Normal effort, respirations not labored, Bilateral breath sounds present, no wheeze, Rales  Abdomen: Soft, NT/ND, +BS, no rebound/guarding   Extremities: No edema, clubbing, cyanosis  Musculoskeletal: No joint deformity  Neuro: AAOx3, no gross cranial nerve abnormality, motor power normal  Psych: Cooperative, appropriate    Labs:    Lab Results   Component Value Date    WBC 6.40 04/14/2021    HGB 13.3 04/14/2021    HCT 39.0 04/14/2021     04/14/2021    ALT 26 04/14/2021    AST 32 04/14/2021     04/14/2021    K 3.6 04/14/2021     04/14/2021     CREATININE 0.8 04/14/2021    BUN 23 (H) 04/14/2021    CO2 22 04/14/2021    INR 1.0 04/09/2021    HGBA1C 8.5 (H) 04/10/2021     Imaging:  No new chest imaging to review.  Chest x-ray ordered for today, needs follow-up    IMPRESSION AND PLAN    Acute hypoxic respiratory failure  Not O2 dependent prehospital. O2 needs secondary to COVID19 infection.  - Titrate FiO2 to maintain SpO2 >= 90%  - Wean as condition permits.  -Encourage incentive spirometry.  - Modified proning tolerates.     COVID19 infection  Pneumonia  PCR+ 4/9. Chest imaging with bibasilar infiltrates.  - 6mg IV Decadron daily. Day #6 of therapy. Recommend 10 day course of therapy.  - IV Remdesivir, day #5, completing today.  - On Dulera, 200-5 mcg, 2 puffs twice daily.  - VTE ppx subcutaneous Lovenox 0.5 mg/kg BID.  -We will obtain bilateral lower extremity Dopplers to rule out DVT  -Chest x-ray ordered today, follow-up results.    Acute cough  Likely in the setting of hypersensitivity cough syndrome due to COVID-19 pneumonia  Continue Dulera at the same dose and can be used with the assistance of spacer    DVT prophylaxis: subcutaneous Lovenox 50 mg every 12 hours   Code status: Full code    Mayra Hester MD  Pulmonary and Critical Care

## 2021-04-14 NOTE — PATIENT CARE CONFERENCE
Care Progression Rounds Note  Date: 4/14/2021  Time: 11:30 AM     Patient Name: Maylin Stiles     Medical Record Number: [id number]   YOB: 1960  Sex: Female      Room/Bed: 4507D    Admitting Diagnosis: Hypoxia [R09.02]  Pneumonia due to COVID-19 virus [U07.1, J12.82]   Admit Date/Time: 4/9/2021  5:48 PM    Primary Diagnosis: Pneumonia due to COVID-19 virus  Principal Problem: Pneumonia due to COVID-19 virus    GMLOS: 4.8  Anticipated Discharge Date: 4/16/2021    AM-PAC:  Mobility Score:      Discharge Planning:  Living Arrangements: house  Anticipated Discharge Disposition: home without services    Barriers to Discharge:  Barriers to Discharge: Medical issues not resolved  Comment: 10L [place], decadron/ remdesivir day 5    Participants:  , dietitian/nutrition services, nursing, social work/services

## 2021-04-15 DIAGNOSIS — Z23 ENCOUNTER FOR IMMUNIZATION: ICD-10-CM

## 2021-04-15 LAB
ALBUMIN SERPL-MCNC: 3.4 G/DL (ref 3.4–5)
ALP SERPL-CCNC: 53 IU/L (ref 35–126)
ALT SERPL-CCNC: 32 IU/L (ref 11–54)
ANION GAP SERPL CALC-SCNC: 13 MEQ/L (ref 3–15)
AST SERPL-CCNC: 33 IU/L (ref 15–41)
BASOPHILS # BLD: 0.02 K/UL (ref 0.01–0.1)
BASOPHILS NFR BLD: 0.3 %
BILIRUB SERPL-MCNC: 0.9 MG/DL (ref 0.3–1.2)
BUN SERPL-MCNC: 20 MG/DL (ref 8–20)
CALCIUM SERPL-MCNC: 8.8 MG/DL (ref 8.9–10.3)
CHLORIDE SERPL-SCNC: 101 MEQ/L (ref 98–109)
CO2 SERPL-SCNC: 24 MEQ/L (ref 22–32)
CREAT SERPL-MCNC: 0.7 MG/DL (ref 0.6–1.1)
DIFFERENTIAL METHOD BLD: ABNORMAL
EOSINOPHIL # BLD: 0.03 K/UL (ref 0.04–0.36)
EOSINOPHIL NFR BLD: 0.4 %
ERYTHROCYTE [DISTWIDTH] IN BLOOD BY AUTOMATED COUNT: 12.4 % (ref 11.7–14.4)
GFR SERPL CREATININE-BSD FRML MDRD: >60 ML/MIN/1.73M*2
GLUCOSE BLD-MCNC: 135 MG/DL (ref 70–99)
GLUCOSE BLD-MCNC: 233 MG/DL (ref 70–99)
GLUCOSE BLD-MCNC: 252 MG/DL (ref 70–99)
GLUCOSE BLD-MCNC: 277 MG/DL (ref 70–99)
GLUCOSE SERPL-MCNC: 160 MG/DL (ref 70–99)
HCT VFR BLDCO AUTO: 40.1 % (ref 35–45)
HGB BLD-MCNC: 13.4 G/DL (ref 11.8–15.7)
IMM GRANULOCYTES # BLD AUTO: 0.11 K/UL (ref 0–0.08)
IMM GRANULOCYTES NFR BLD AUTO: 1.5 %
LYMPHOCYTES # BLD: 1.52 K/UL (ref 1.2–3.5)
LYMPHOCYTES NFR BLD: 21.2 %
M TB IFN-G BLD-IMP: NEGATIVE
MCH RBC QN AUTO: 31.3 PG (ref 28–33.2)
MCHC RBC AUTO-ENTMCNC: 33.4 G/DL (ref 32.2–35.5)
MCV RBC AUTO: 93.7 FL (ref 83–98)
MITOGEN-NIL: >10 IU/ML
MONOCYTES # BLD: 0.6 K/UL (ref 0.28–0.8)
MONOCYTES NFR BLD: 8.4 %
NEUTROPHILS # BLD: 4.88 K/UL (ref 1.7–7)
NEUTS SEG NFR BLD: 68.2 %
NIL: 0.02 IU/ML
NRBC BLD-RTO: 0 %
PDW BLD AUTO: 10.5 FL (ref 9.4–12.3)
PLATELET # BLD AUTO: 227 K/UL (ref 150–369)
POCT TEST: ABNORMAL
POTASSIUM SERPL-SCNC: 3.9 MEQ/L (ref 3.6–5.1)
PROT SERPL-MCNC: 5.7 G/DL (ref 6–8.2)
RBC # BLD AUTO: 4.28 M/UL (ref 3.93–5.22)
SODIUM SERPL-SCNC: 138 MEQ/L (ref 136–144)
TB AG-NIL: 0 IU/ML
TB2 AG - NIL: 0.01 IU/ML
WBC # BLD AUTO: 7.16 K/UL (ref 3.8–10.5)

## 2021-04-15 PROCEDURE — 99232 SBSQ HOSP IP/OBS MODERATE 35: CPT | Performed by: HOSPITALIST

## 2021-04-15 PROCEDURE — 63600000 HC DRUGS/DETAIL CODE: Mod: JW | Performed by: HOSPITALIST

## 2021-04-15 PROCEDURE — 97530 THERAPEUTIC ACTIVITIES: CPT | Mod: GO

## 2021-04-15 PROCEDURE — 36415 COLL VENOUS BLD VENIPUNCTURE: CPT | Performed by: INTERNAL MEDICINE

## 2021-04-15 PROCEDURE — 85025 COMPLETE CBC W/AUTO DIFF WBC: CPT | Performed by: INTERNAL MEDICINE

## 2021-04-15 PROCEDURE — 97116 GAIT TRAINING THERAPY: CPT | Mod: GP,CQ

## 2021-04-15 PROCEDURE — 63700000 HC SELF-ADMINISTRABLE DRUG: Performed by: INTERNAL MEDICINE

## 2021-04-15 PROCEDURE — 63700000 HC SELF-ADMINISTRABLE DRUG: Performed by: HOSPITALIST

## 2021-04-15 PROCEDURE — 80053 COMPREHEN METABOLIC PANEL: CPT | Performed by: INTERNAL MEDICINE

## 2021-04-15 PROCEDURE — 20600000 HC ROOM AND CARE INTERMEDIATE/TELEMETRY

## 2021-04-15 RX ADMIN — INSULIN ASPART 8 UNITS: 100 INJECTION, SOLUTION INTRAVENOUS; SUBCUTANEOUS at 17:38

## 2021-04-15 RX ADMIN — ACETAMINOPHEN 650 MG: 325 TABLET ORAL at 08:51

## 2021-04-15 RX ADMIN — DEXAMETHASONE SODIUM PHOSPHATE 6 MG: 4 INJECTION, SOLUTION INTRA-ARTICULAR; INTRALESIONAL; INTRAMUSCULAR; INTRAVENOUS; SOFT TISSUE at 08:33

## 2021-04-15 RX ADMIN — OXYCODONE HYDROCHLORIDE AND ACETAMINOPHEN 500 MG: 500 TABLET ORAL at 17:37

## 2021-04-15 RX ADMIN — ENOXAPARIN SODIUM 50 MG: 60 INJECTION SUBCUTANEOUS at 05:34

## 2021-04-15 RX ADMIN — OXYCODONE HYDROCHLORIDE AND ACETAMINOPHEN 500 MG: 500 TABLET ORAL at 08:33

## 2021-04-15 RX ADMIN — ACETAMINOPHEN 650 MG: 325 TABLET ORAL at 21:40

## 2021-04-15 RX ADMIN — ATORVASTATIN CALCIUM 20 MG: 20 TABLET, FILM COATED ORAL at 21:41

## 2021-04-15 RX ADMIN — INSULIN ASPART 6 UNITS: 100 INJECTION, SOLUTION INTRAVENOUS; SUBCUTANEOUS at 17:38

## 2021-04-15 RX ADMIN — INSULIN ASPART 6 UNITS: 100 INJECTION, SOLUTION INTRAVENOUS; SUBCUTANEOUS at 21:41

## 2021-04-15 RX ADMIN — INSULIN ASPART 4 UNITS: 100 INJECTION, SOLUTION INTRAVENOUS; SUBCUTANEOUS at 12:18

## 2021-04-15 RX ADMIN — Medication 2000 UNITS: at 17:37

## 2021-04-15 RX ADMIN — MOMETASONE FUROATE AND FORMOTEROL FUMARATE DIHYDRATE 2 PUFF: 200; 5 AEROSOL RESPIRATORY (INHALATION) at 17:38

## 2021-04-15 RX ADMIN — GUAIFENESIN 600 MG: 600 TABLET, EXTENDED RELEASE ORAL at 20:07

## 2021-04-15 RX ADMIN — ENOXAPARIN SODIUM 50 MG: 60 INJECTION SUBCUTANEOUS at 17:38

## 2021-04-15 RX ADMIN — PREGABALIN 150 MG: 75 CAPSULE ORAL at 20:07

## 2021-04-15 RX ADMIN — INSULIN ASPART 8 UNITS: 100 INJECTION, SOLUTION INTRAVENOUS; SUBCUTANEOUS at 12:18

## 2021-04-15 RX ADMIN — PREGABALIN 150 MG: 75 CAPSULE ORAL at 08:33

## 2021-04-15 RX ADMIN — MOMETASONE FUROATE AND FORMOTEROL FUMARATE DIHYDRATE 2 PUFF: 200; 5 AEROSOL RESPIRATORY (INHALATION) at 05:34

## 2021-04-15 RX ADMIN — CITALOPRAM HYDROBROMIDE 5 MG: 10 TABLET ORAL at 08:33

## 2021-04-15 RX ADMIN — INSULIN GLARGINE 18 UNITS: 100 INJECTION, SOLUTION SUBCUTANEOUS at 08:34

## 2021-04-15 RX ADMIN — INSULIN ASPART 8 UNITS: 100 INJECTION, SOLUTION INTRAVENOUS; SUBCUTANEOUS at 08:34

## 2021-04-15 RX ADMIN — GUAIFENESIN 600 MG: 600 TABLET, EXTENDED RELEASE ORAL at 08:33

## 2021-04-15 ASSESSMENT — COGNITIVE AND FUNCTIONAL STATUS - GENERAL
WALKING IN HOSPITAL ROOM: 3 - A LITTLE
HELP NEEDED FOR BATHING: 3 - A LITTLE
AFFECT: WFL
EATING MEALS: 4 - NONE
HELP NEEDED FOR PERSONAL GROOMING: 4 - NONE
DRESSING REGULAR UPPER BODY CLOTHING: 4 - NONE
MOVING TO AND FROM BED TO CHAIR: 4 - NONE
TOILETING: 4 - NONE
STANDING UP FROM CHAIR USING ARMS: 4 - NONE
AFFECT: WNL
DRESSING REGULAR LOWER BODY CLOTHING: 3 - A LITTLE
CLIMB 3 TO 5 STEPS WITH RAILING: 3 - A LITTLE

## 2021-04-15 NOTE — PLAN OF CARE
Problem: Adult Inpatient Plan of Care  Goal: Plan of Care Review  Outcome: Progressing  Flowsheets (Taken 4/15/2021 7180)  Progress: improving  Plan of Care Reviewed With: patient  Outcome Summary: Pt is IND with txfrs, sup for amb, limiting factor is deconditioning from illness. Pt will benefit from ongoing therapy while in hospital to increase strength and endurance and progress to IND.     Problem: Mobility Impairment  Goal: Optimal Mobility  Outcome: Progressing

## 2021-04-15 NOTE — PLAN OF CARE
Problem: Adult Inpatient Plan of Care  Goal: Plan of Care Review  Outcome: Progressing  Flowsheets (Taken 4/15/2021 1812)  Progress: improving  Plan of Care Reviewed With: patient  Outcome Summary: Pt aox3. vss. weaned to 7L [place]. Pt worked with PT. independent w/ transfers. appetite good. tenative d/c tomorrow. will continue to monitor hourly and as needed.

## 2021-04-15 NOTE — PROGRESS NOTES
Hospital Medicine Service -  Daily Progress Note       SUBJECTIVE   Interval History:   Patient seen and examined at bed side this am   She could not sleep well last night and catching up her sleep this am   Denies any chest pain /dyspnea /palpitation while at rest - no over night event noted .     OBJECTIVE      Vital signs in last 24 hours:  Temp:  [36.7 °C (98 °F)-37.3 °C (99.2 °F)] 37.3 °C (99.2 °F)  Heart Rate:  [60-71] 62  Resp:  [18-20] 18  BP: ()/(53-69) 105/54  No intake or output data in the 24 hours ending 04/15/21 6025    PHYSICAL EXAMINATION      Physical Exam    HEENT : Normocephalic / vision intact .  LUNGS : Decreased air entry in both lungs   HEART : Regular / no Murmur   ABDOMEN : Soft / non tender / audible BS   EXTREMITY :  No edema   MUSCULOSKELETAL : No deformity   CNS : AAO X 3 , moving all limbs equally   BEHAVIORAL : Co-operative   SKIN .: No erythema / rash / ulcer      LINES, CATHETERS, DRAINS, AIRWAYS, AND WOUNDS   Lines, Drains, and Airways:  Wounds (agree with documentation and present on admission):  Peripheral IV 04/09/21 Right Forearm (Active)   Number of days: 3         Comments:      LABS / IMAGING / TELE      Labs  Lytes normal     No results found for: COVID19    Imaging  CXR  On admission - with multifocal pneumonia     ECG/Telemetry  NSR     ASSESSMENT AND PLAN      * Pneumonia due to COVID-19 virus  Assessment & Plan  With associated hypoxia, the patient saturation is 95% on 2L/min supplemental oxygen.  Found to be COVID-19 positive on 4/9 , her  already has it.    IV Decadron, prn Albuterol.  Pulmonary and ID are following     Further details in acute respiratory failure with hypoxia section.    Sepsis (CMS/HCC)  Assessment & Plan  Patient with sepsis, secondary to COVID-19 pneumonia.  Draw blood cultures.  Follow up on blood cultures.  ID following.    Acute respiratory failure with hypoxia (CMS/HCC)  Assessment & Plan  Acute respiratory failure with hypoxia,  currently requiring 4 L of oxygen via nasal cannula to keep oxygen levels above 88.    This is secondary to COVID-19 pneumonia.  Continue pulmonary toileting, incentive spirometry, frequent position shifts.     Remdesivir initiated D # 5/5  D # 7/10  of IV dexamethasone   Continue 0.5mg/kg of Lovenox given vascular complications of Covid.  Started on Dulera for intractable cough   Increasing hypoxia today needing nonheated high flow at 13-15 liter with oxygen saturation 93-94 %   Continue to titrate oxygen to keep sat >92 %   Decadron 6 mg IV, 10-day course.or once hypoxia resolves    Started to wean off from oxygen on 4/14 - monitor   Home oxygen eval prior to discharge .      Fibromyalgia  Assessment & Plan  Continue Lyrica.    Type 2 diabetes mellitus, without long-term current use of insulin (CMS/[facility])  Assessment & Plan      HbA1c of 8.5  Uncontrolled BG while on IV steroids   Insulin adjusted again on 4/14  Holding oral med for now        VTE Assessment: Padua VTE Score: 2  VTE Prophylaxis:  Current anticoagulants:  enoxaparin (LOVENOX) syringe 50 mg, subcutaneous, q12h (6a, 6p)      Code Status: Full Code  Palliative Care Screening Score: 0   Estimated Discharge Date: 4/16/2021   Disposition Planning: Home oxygen eval tomorrow - possible home with home care      Delia Costello MD  4/15/2021

## 2021-04-15 NOTE — PLAN OF CARE
Problem: Adult Inpatient Plan of Care  Goal: Plan of Care Review  Outcome: Progressing  Flowsheets (Taken 4/15/2021 1505)  Progress: improving  Plan of Care Reviewed With: patient  Outcome Summary: OT tx session complete. Pt continues to be limited by decrd activity tolerance. Will continue to follow for OT while in house.

## 2021-04-15 NOTE — PROGRESS NOTES
Infectious Disease Progress Note    Patient Name: Maylin Stiles  MR#: [id number]  : 1960  Admission Date: 2021  Date: 04/15/21   Time: 11:58 AM   Author: ANJELICA Glass    Major Events:     Antibiotics:        Subjective   Patient states she is tired and having some mild low back pain, tolerating diet, admits to bloody drainage from nose, no productive bloody sputum, states she has been proning and using incentive    Review of Systems    Pertinent items are noted in HPI.    Objective     Vital Signs:    Patient Vitals for the past 72 hrs:   BP Temp Temp src Pulse Resp SpO2   04/15/21 1143 (!) 105/54 37.3 °C (99.2 °F) Oral 62 18 98 %   04/15/21 0900 (!) 99/53 -- -- -- -- --   04/15/21 0805 -- -- -- -- 18 92 %   04/15/21 0736 (!) 82/65 36.9 °C (98.5 °F) Oral 71 18 94 %   04/15/21 0530 (!) 114/57 37.1 °C (98.8 °F) Oral -- 19 94 %   04/15/21 0300 -- -- -- 63 -- --   04/15/21 0000 99/69 36.8 °C (98.2 °F) Oral 60 20 93 %   21 2000 128/64 36.7 °C (98.1 °F) Oral 63 20 96 %   21 1900 -- -- -- 60 -- --   21 1600 (!) 109/59 36.7 °C (98 °F) Oral 69 20 95 %   21 1356 110/61 -- -- 73 -- 96 %   21 1335 109/60 -- -- 72 -- 97 %   21 1200 -- -- -- 72 -- --   21 1144 107/61 36.6 °C (97.9 °F) Axillary 66 20 95 %   21 0855 -- -- -- -- -- 92 %   21 0844 113/62 37.3 °C (99.2 °F) Oral 62 20 95 %   21 0400 -- -- -- 87 -- --   21 0000 (!) 99/58 36.8 °C (98.2 °F) Oral 60 20 92 %   21 2030 122/74 36.8 °C (98.2 °F) Oral -- 20 95 %   21 -- -- -- 64 -- --   21 1900 -- -- -- 60 -- --   21 1507 112/66 37 °C (98.6 °F) Oral 66 20 94 %   21 1451 109/66 37.1 °C (98.8 °F) Oral 66 -- 94 %   21 1437 107/61 37.1 °C (98.7 °F) Oral 67 -- 94 %   21 1420 99/61 37 °C (98.6 °F) Oral 67 20 96 %   21 1200 107/61 37 °C (98.6 °F) Oral 66 20 95 %   21 0836 128/79 36.8 °C (98.3 °F) Axillary 64 20 93 %   21 0400  "119/67 36.9 °C (98.5 °F) Oral 67 20 95 %   21 0020 -- -- -- -- -- 94 %   21 0000 130/73 36.6 °C (97.9 °F) Oral 65 20 94 %   21 114/74 36.8 °C (98.3 °F) Oral 68 20 95 %   21 -- -- -- -- -- 95 %   21 -- -- -- 71 -- --   21 1600 -- -- -- 64 -- --   21 1547 (!) 100/49 37.2 °C (98.9 °F) Oral 66 18 (!) 91 %   21 1200 -- -- -- 80 -- --       Temp (72hrs), Av.9 °C (98.5 °F), Min:36.6 °C (97.9 °F), Max:37.3 °C (99.2 °F)      Physical Exam:    Visit Vitals  BP (!) 105/54   Pulse 62   Temp 37.3 °C (99.2 °F) (Oral)   Resp 18   Ht 1.676 m (5' 6\")   Wt 99.8 kg (220 lb)   LMP  (LMP Unknown)   SpO2 98%   BMI 35.51 kg/m²     General appearance: alert, appears stated age, cooperative and no distress  Eyes: anicteric  Lungs: diminished and bilateral crackles, nonlabored, no wheezes or rhonchi  Abdomen: soft, ND, NT  Neurologic: Mental status: Alert, oriented, thought content appropriate    Lines, Drains, Airways, Wounds:  Peripheral IV 21 Right Forearm (Active)   Number of days: 6       Labs:    CBC Results       04/15/21 04/14/21 04/13/21                    0549 0542 0507         WBC 7.16 6.40 4.31         RBC 4.28 4.19 4.53         HGB 13.4 13.3 14.5         HCT 40.1 39.0 42.9         MCV 93.7 93.1 94.7         MCH 31.3 31.7 32.0         MCHC 33.4 34.1 33.8          224 208                   BMP Results       04/15/21 04/14/21 04/13/21                    0549 0542 0508          138 140         K 3.9 3.6 4.1         Cl 101 103 102         CO2 24 22 29         Glucose 160 138 210         BUN 20 23 25         Creatinine 0.7 0.8 0.8         Calcium 8.8 8.8 9.1         Anion Gap 13 13 9         EGFR >60.0 >60.0 >60.0                   PT/PTT Results       21           PT 12.8           INR 1.0           PTT 36           Comment for INR at  on 21: INR has no defined significance when PT is within Reference " Range.    Comment for PTT at 1956 on 04/09/21: The Standard Therapeutic Range for Heparin is 68 to 101 seconds.      UA Results    No lab values to display.     Lactate Results    No lab values to display.         Microbiology Results     Procedure Component Value Units Date/Time    Blood Culture Blood, Venous [[id number]] Collected: 04/11/21 1046    Specimen: Blood, Venous Updated: 04/14/21 1501     Culture No growth at 72 hours    Blood Culture Blood, Venous [[id number]] Collected: 04/11/21 1046    Specimen: Blood, Venous Updated: 04/14/21 1501     Culture No growth at 72 hours          Pathology Results     ** No results found for the last 720 hours. **          Echo:         Imaging:    Radiology Imaging    XR CHEST 1 VW    Narrative  CLINICAL HISTORY: Respiratory failure, noncardiac cause    COMMENT: A single AP radiograph of the chest is obtained . Comparison is made to  prior exam of 4/9/2021. The cardiomediastinal silhouette is top normal.  Elevated right hemidiaphragm.  There is diffuse interstitial infiltrates with  worsening.  No naga pleural effusions.  No pneumothorax.    --    Impression  Diffuse interstitial infiltrates with worsening.      Assessment     COVID 19 PNA with acute hypoxic respiratory failure  - on 10 liters salter NC  - CXR with Diffuse interstitial infiltrates with worsening   - low grade temp  - no leukocytosis  - quantiferon pending  - on steroids   - completed course of remdesivir on 4/14/21  - renal and liver function stable   - blood sterile            Plan     Continue supportive care, will discuss with Dr Reis

## 2021-04-15 NOTE — PROGRESS NOTES
Pulmonary Progress Note      Subjective:  Resting in bed. On 7L salter cannula.  Respirations not labored at rest. Feeling better today.  Breathing is slowly improving. Continues to have a cough.  No sputum production thus far. Has nasal congestion.  Also notes nares are dry from O2 therapy.  Has been proning on her own & reports compliance w/ incentive spirometry.  Discussed case w/ RN.  Desaturations noted overnight, requiring 10 liter salter.  Sats improved this AM & patient able to be weaned.  Denies any fevers, chills, chest congestion.  Chart/labs/vitals reviewed.    Allergies: Patient has no known allergies.    Medications:  Current Facility-Administered Medications   Medication Dose Route Frequency Provider Last Rate Last Admin   • acetaminophen (TYLENOL) tablet 650 mg  650 mg oral q4h PRN Robby Norris MD   650 mg at 04/15/21 0851   • albuterol HFA (VENTOLIN HFA) 90 mcg/actuation inhaler 2 puff  2 puff inhalation q6h PRN Robby Norris MD   2 puff at 04/13/21 0928   • ascorbic acid (VITAMIN C) tablet 500 mg  500 mg oral BID with meals Fahad Mcmahan MD   500 mg at 04/15/21 0833   • atorvastatin (LIPITOR) tablet 20 mg  20 mg oral Nightly Robby Norris MD   20 mg at 04/14/21 2203   • benzonatate (TESSALON) capsule 100 mg  100 mg oral q4h PRN Delia Costello MD   100 mg at 04/14/21 0008   • butalbital-acetaminophen-caff (FIORICET, ESGIC) per tablet 1 tablet  1 tablet oral q6h PRN Fahad Mcmahan MD   1 tablet at 04/12/21 0520   • cholecalciferol (vitamin D3) tablet 2,000 Units  2,000 Units oral Daily with dinner Fahad Mcmahan MD   2,000 Units at 04/14/21 1738   • citalopram (celeXA) tablet 5 mg  5 mg oral Daily Robby Norris MD   5 mg at 04/15/21 0833   • dexamethasone (DECADRON) injection 6 mg  6 mg intravenous Daily Robby Norris MD   6 mg at 04/15/21 0833   • enoxaparin (LOVENOX) syringe 50 mg  50 mg subcutaneous q12h (6a, 6p) Robby Norris MD   50 mg at 04/15/21 0570   •  guaiFENesin (MUCINEX) 12 hr ER tablet 600 mg  600 mg oral BID Fahad Mcmahan MD   600 mg at 04/15/21 0833   • insulin aspart U-100 (NovoLOG) pen 2-12 Units  2-12 Units subcutaneous With meals & nightly Delia Costello MD   4 Units at 04/15/21 1218   • insulin aspart U-100 (NovoLOG) pen 8 Units  8 Units subcutaneous TID with meals Delia Costello MD   8 Units at 04/15/21 1218   • insulin glargine U-100 (LANTUS SOLOSTAR/BASAGLAR) pen 18 Units  18 Units subcutaneous Daily Delia Costello MD   18 Units at 04/15/21 0834   • mometasone-formoterol (DULERA 200) 200-5 mcg/actuation inhaler 2 puff  2 puff inhalation BID (6a, 6p) Mayra Hester MD   2 puff at 04/15/21 0534   • pregabalin (LYRICA) capsule 150 mg  150 mg oral BID Robby Norris MD   150 mg at 04/15/21 0833      Review of Systems:  Unchanged except as noted in HPI    Vital signs in last 24 hours:  Temp:  [36.7 °C (98 °F)-37.3 °C (99.2 °F)] 37.3 °C (99.2 °F)  Heart Rate:  [60-71] 62  Resp:  [18-20] 18  BP: ()/(53-69) 105/54    Input/Ouput in Last 24 hours:  No intake or output data in the 24 hours ending 04/15/21 1414    Physical Exam  General: Well appearing female, NAD, requiring 7L [place] cannula  HEENT: Moist membranes, EOMI, PERRL, anicteric sclera   Neck: Supple, no JVD, no tug or stridor, trach midline  Cardiac: RRR, +S1/S2, no murmur appreciated  Lungs: Normal effort, respirations not labored  Decreased breath sounds bilaterally  Scattered fine inspiratory rales posteriorly  No wheeze/rhonchi  +Dry, non-productive cough w/ deep breathing maneuvers  Abdomen: Soft, NT/ND, +BS, no rebound/guarding   : Deferred  Extremities: No edema, clubbing, cyanosis  Musculoskeletal: No joint deformity  Vascular: No ischemia  Neuro: AAOx3, nonfocal  Skin: Warm, limited exam, defer to attending/nursing  Psych: Cooperative, appropriate    Labs:    Lab Results   Component Value Date    WBC 7.16 04/15/2021    HGB 13.4 04/15/2021    HCT 40.1 04/15/2021      04/15/2021    ALT 32 04/15/2021    AST 33 04/15/2021     04/15/2021    K 3.9 04/15/2021     04/15/2021    CREATININE 0.7 04/15/2021    BUN 20 04/15/2021    CO2 24 04/15/2021    INR 1.0 04/09/2021    HGBA1C 8.5 (H) 04/10/2021     Imaging:      CXR 4/14/2021  IMPRESSION  Diffuse interstitial infiltrates with worsening.    ECG/Telemetry: SR @ 68    IMPRESSION AND PLAN    Acute hypoxic respiratory failure  Worsening O2 requirements since presentation likely secondary to COVID19 infection.  - Desaturations noted overnight, requiring 10L [place] cannula.  - Weaned to 7L salter cannula this morning.  - Continue supplemental oxygen. Titrate FiO2 to maintain SpO2 >= 90%  -- Wean as condition permits.  - Will order nasal spray for congestion/dryness.  - Encouraged modified proning/incentive spirometry.  -- States she has been proning on her own.     COVID19 infection  Pneumonia  PCR+ 4/9. Chest imaging with bibasilar infiltrates.  - 6mg IV Decadron daily. Day #7/10 of therapy.   - S/p IV Remdesivir 4/14  - Continue Dulera, 200-5 mcg, 2 puffs twice daily.  - Mucinex BID.  - VTE ppx subcutaneous Lovenox 0.5 mg/kg BID.  - Repeat chest x-ray PRN.     -- Additional history per EMR/attending.     Diet: Regular consistency, cardiac diet  DVT prophylaxis: subcutaneous Lovenox 40mg daily  GI prophylaxis: Protonix  Code status: Full code  Case d/w: patient, RN Bibi, Dr. Hester

## 2021-04-15 NOTE — PLAN OF CARE
Problem: Adult Inpatient Plan of Care  Goal: Plan of Care Review  Flowsheets (Taken 4/15/2021 5992)  Progress: improving  Outcome Summary: Pt discussed in Care Progression Rounds. Pt continues on Decadron IV and O2 7 liters   via salter. Pt will need a Home O2 eval upon discharge. CC will continue to follow and assist with discharge planning.

## 2021-04-15 NOTE — PROGRESS NOTES
Patient: Maylin Stiles  Location: [address] [address]  MRN: [id number]  Today's date: 4/15/2021    Patient returned OOB, on 7L salter, seated in recliner chair with tray, hospital phone, call bell and personal items accessible. In NAD. RN aware.    Maylin Jarvis is a 60 y.o. female admitted on 4/9/2021 with Hypoxia [R09.02]  Pneumonia due to COVID-19 virus [U07.1, J12.82]. Principal problem is Pneumonia due to COVID-19 virus.    Past Medical History  Maylin Jarvis has a past medical history of Depression with anxiety, Diabetes mellitus (CMS/HCC), and Fibromyalgia.    History of Present Illness   Pt admit to ER with cough, myalgias, fever, fatigue; COVID-19 test is POSITIVE. Her CXR shows B/L small infiltrates           OT Vitals    Date/Time SpO2 Pt Activity O2 Therapy O2 Del Method O2 Flow Rate Observations [facility]   04/15/21 1405 97 % At rest Supplemental oxygen High flow nasal cannula 7 L/min start of session AB   04/15/21 1425 93 % Walking Supplemental oxygen Nasal cannula 8 L/min ambulating in room AB      OT Pain    Date/Time Pain Type Pref Pain Scale Location Rating: Rest [facility]   04/15/21 1405 Pain Assessment number (Numeric Rating Pain Scale) 0 0 AB          Prior Living Environment      Most Recent Value   People in Home  spouse, child(kannan), adult   Living Arrangements  house   Current Living Arrangements  home/apartment/condo   Home Accessibility  stairs to enter home (Group), stairs within home (Group)   Number of Stairs, Main Entrance  2   Number of Stairs, Within Home, Primary  12          Prior Level of Function      Most Recent Value   Dominant Hand  right   Ambulation  independent   Transferring  independent   Toileting  independent   Bathing  independent   Dressing  independent   Eating  independent   Prior Level of Function Comment  indep with ADL's and mobility without AD   Assistive Device/Animal Currently Used at Home  none          Occupational Profile      Most Recent Value   Occupational  History/Life Experiences  homemaker, starting new job as  shortly   Environmental Supports and Barriers  lives with , also COVID+, working from home   Patient Goals  to return home          OT Evaluation and Treatment - 04/15/21 1405        OT Time Calculation    Start Time  1405     Stop Time  1426     Time Calculation (min)  21 min        Session Details    Document Type  daily treatment/progress note     Mode of Treatment  occupational therapy        General Information    Patient Profile Reviewed  yes     Onset of Illness/Injury or Date of Surgery  04/09/21     Referring Physician  Trang     General Observations of Patient  Pt received OOB seated in chair, on 7L salter, agreeable to therapy     Existing Precautions/Restrictions  fall;contact;droplet;oxygen therapy device and L/min        Cognition/Psychosocial    Affect/Mental Status (Cognition)  WFL     Orientation Status (Cognition)  oriented x 4     Follows Commands (Cognition)  WFL     Cognitive Function  WFL     Comment, Cognition  pt pleasant and cooperative, demo's fair+ insight to func deficits        Bed Mobility    Comment (Bed Mobility)  pt received OOB        Transfers    Comment  SUP func mobility without AD within room distances, SpO2 93% while on 8L NC        Sit to Stand Transfer    [place], Sit to Stand Transfer  independent     Verbal Cues  safety     Assistive Device  none        Stand to Sit Transfer    [place], Stand to Sit Transfer  independent     Verbal Cues  safety     Assistive Device  none        Toilet Transfer    Comment  pt declined to perform, per RN and pt report, has been using commode independently        Balance    Static Sitting Balance  WFL     Dynamic Sitting Balance  WFL     Static Standing Balance  WFL     Dynamic Standing Balance  WFL     Comment, Balance  no LOB observed        Motor Skills    Functional Endurance  fair; SpO2 >93% on 8L NC throughout standing activities        Lower Body  Dressing    Tasks  socks;don;doff     [place]kins  supervision     Position  edge of bed sitting     Comment  don/doff socks using figure 4 technique seated EOB        Grooming    Self-Performance  oral care (brushing teeth, cleaning dentures)     [place]  independent     Position  supported sitting        AM-PAC (TM) - ADL (Current Function)    Putting on and taking off regular lower body clothing?  3 - A Little     Bathing?  3 - A Little     Toileting?  4 - None     Putting on/taking off regular upper body clothing?  4 - None     How much help for taking care of personal grooming?  4 - None     Eating meals?  4 - None     AM-PAC (TM) ADL Score  22        Therapy Assessment/Plan (OT)    Rehab Potential (OT)  good, to achieve stated therapy goals     Therapy Frequency (OT)  3-5 times/wk        Progress Summary (OT)    Daily Outcome Statement (OT)  Pt progressing towards OT goals, able to tolerate increased standing functional tasks, SpO2 >90% on 8L NC throughout session. She is indep func transfers and SUP func mobility for line management. She is SUP seated LB dressing. Will continue to follow for OT while in house.        Therapy Plan Review/Discharge Plan (OT)    OT Recommended Discharge Disposition  home     Anticipated Equipment Needs At Discharge (OT)  shower chair                  Education provided this session. See the Patient Education summary report for full details.         OT Goals      Most Recent Value   Transfer Goal 1   Activity/Assistive Device  toilet at 04/14/2021 1453   Hopkins  independent at 04/14/2021 1453   Time Frame  by discharge at 04/14/2021 1453   Progress/Outcome  goal ongoing at 04/14/2021 1453   Dressing Goal 1   Activity/Adaptive Equipment  dressing skills, all at 04/14/2021 1453   Hopkins  independent at 04/14/2021 1453   Time Frame  by discharge at 04/14/2021 1453   Progress/Outcome  goal ongoing at 04/14/2021 1453   Problem Specific Goal 1   Problem-Specific Goal  1  increase activity tolerance during self cares with O2 sat above 90% at 04/14/2021 1453   Time Frame  by discharge at 04/14/2021 1453   Progress/Outcome  goal ongoing at 04/14/2021 1453

## 2021-04-15 NOTE — PATIENT CARE CONFERENCE
Care Progression Rounds Note  Date: 4/15/2021  Time: 11:26 AM     Patient Name: Maylin Stiles     Medical Record Number: [id number]   YOB: 1960  Sex: Female      Room/Bed: 4507D    Admitting Diagnosis: Hypoxia [R09.02]  Pneumonia due to COVID-19 virus [U07.1, J12.82]   Admit Date/Time: 4/9/2021  5:48 PM    Primary Diagnosis: Pneumonia due to COVID-19 virus  Principal Problem: Pneumonia due to COVID-19 virus    GMLOS: 4.8  Anticipated Discharge Date: 4/16/2021    AM-PAC:  Mobility Score: 22    Discharge Planning:  Living Arrangements: house  Current Living Arrangements: home/apartment/condo  Anticipated Discharge Disposition: home without services    Barriers to Discharge:  Barriers to Discharge: Medical issues not resolved  Comment: 10L salter- will attempt to wean, decadron, remdesivir completed, PT    Participants:  , nursing, social work/services

## 2021-04-15 NOTE — PROGRESS NOTES
Patient: Maylin Stiles  Location: [place] [id number]  MRN: [id number]  Today's date: 4/15/2021   Pt left seated in chair with call bell, tray table and Nurse notified.    Maylin Jarvis is a 60 y.o. female admitted on 4/9/2021 with Hypoxia [R09.02]  Pneumonia due to COVID-19 virus [U07.1, J12.82]. Principal problem is Pneumonia due to COVID-19 virus.    Past Medical History  Maylin Jarvis has a past medical history of Depression with anxiety, Diabetes mellitus (CMS/HCC), and Fibromyalgia.    History of Present Illness   Pt admit to ER with cough, myalgias, fever, fatigue; COVID-19 test is POSITIVE. Her CXR shows B/L small infiltrates           PT Vitals    Date/Time SpO2 Pt Activity O2 Therapy O2 Del Method O2 Flow Rate Observations [facility]   04/15/21 1359 93 % Walking Supplemental oxygen Nasal cannula 8 L/min -- [facility]   04/15/21 1405 97 % At rest Supplemental oxygen High flow nasal cannula 7 L/min start of session AB      PT Pain    Date/Time Pain Type Pref Pain Scale Location Rating: Rest Rating: Activity Rating: Rest [facility]   04/15/21 1359 Pain Assessment word (verbal rating pain scale) -- -- 0 - no pain 0 - no pain [facility]   04/15/21 1405 Pain Assessment number (Numeric Rating Pain Scale) 0 0 -- -- AB          Prior Living Environment      Most Recent Value   People in Home  spouse, child(kannan), adult   Living Arrangements  house   Current Living Arrangements  home/apartment/condo   Home Accessibility  stairs to enter home (Group), stairs within home (Group)   Number of Stairs, Main Entrance  2   Number of Stairs, Within Home, Primary  12          Prior Level of Function      Most Recent Value   Dominant Hand  right   Ambulation  independent   Transferring  independent   Toileting  independent   Bathing  independent   Dressing  independent   Eating  independent   Prior Level of Function Comment  indep with ADL's and mobility without AD   Assistive Device/Animal Currently Used at Home  none          PT Evaluation and  Treatment - 04/15/21 1359        PT Time Calculation    Start Time  1359     Stop Time  1421     Time Calculation (min)  22 min        Session Details    Document Type  daily treatment/progress note     Mode of Treatment  physical therapy        General Information    Patient Profile Reviewed  yes     Referring Physician  Trang     General Observations of Patient  Pt seen OOB in chair     Existing Precautions/Restrictions  contact;droplet;oxygen therapy device and L/min        Cognition/Psychosocial    Affect/Mental Status (Cognition)  WNL     Orientation Status (Cognition)  oriented x 4        Bed Mobility    Comment (Bed Mobility)  Pt seen OOB in chair- reports IND with bed mobility        Sit to Stand Transfer    [place], Sit to Stand Transfer  independent     Assistive Device  none        Stand to Sit Transfer    [place], Stand to Sit Transfer  independent     Assistive Device  none        Gait Training    [place], Gait  supervision     Assistive Device  none     Distance in Feet  50 feet     Pattern (Gait)  step-through     Deviations/Abnormal Patterns (Gait)  anitha decreased;gait speed decreased;step length decreased;stride length decreased     Comment (Gait/Stairs)  mild LEBLANC post amb, 02 sats above 92% t/o        Therapeutic Exercise    Therapeutic Exercise  lower extremity        Lower Extremity (Therapeutic Exercise)    Exercise Position/Type  standing;AROM (active range of motion)     General Exercise  bilateral;heel raises;marching while standing;hip aBduction;hip aDduction;hip extension     Reps and Sets  x 5     Comment  02 sats above 92% post, no rest needed        AM-PAC (TM) - Mobility (Current Function)    Turning from your back to your side while in a flat bed without using bedrails?  4 - None     Moving from lying on your back to sitting on the side of a flat bed without using bedrails?  4 - None     Moving to and from a bed to a chair?  4 - None     Standing up from a chair using  your arms?  4 - None     To walk in a hospital room?  3 - A Little     Climbing 3-5 steps with a railing?  3 - A Little     AM-PAC (TM) Mobility Score  22        Therapy Assessment/Plan (PT)    Rehab Potential (PT)  good, to achieve stated therapy goals     Therapy Frequency (PT)  3-5 times/wk     Criteria for Skilled Interventions Met (PT)  meets criteria     Problem List (PT)  balance;mobility;strength     Activity Limitations Related to Problem List  community activities not performed adequately or safely;home management activity not performed adequately or safely        Progress Summary (PT)    Daily Outcome Statement (PT)  Pt is IND with txfrs, sup for amb . Pt limiting factor is decreased endurance from illness. Pt will benefit from ongoing therapy in hospital to increase strength and endurance, and progress to IND.        Therapy Plan Review/Discharge Plan (PT)    PT Recommended Discharge Disposition  home with assist     Anticipated Equipment Needs at Discharge (PT)  none                       Education provided this session. See the Patient Education summary report for full details.    PT Goals      Most Recent Value   Gait Training Goal 1   Activity/Assistive Device  gait (walking locomotion), no assistive device at 04/14/2021 1357   Baker  independent at 04/14/2021 1357   Distance  100 at 04/14/2021 1357   Time Frame  by discharge at 04/14/2021 1357   Progress/Outcome  goal ongoing at 04/15/2021 1359   Stairs Goal 1   Activity/Assistive Device  ascending stairs, descending stairs, using handrail, left at 04/14/2021 1357   Baker  modified independence at 04/14/2021 1357   Number of Stairs  8 at 04/14/2021 1357   Time Frame  by discharge at 04/14/2021 1357   Progress/Outcome  goal ongoing at 04/15/2021 1359

## 2021-04-16 LAB
ALBUMIN SERPL-MCNC: 3.2 G/DL (ref 3.4–5)
ALP SERPL-CCNC: 54 IU/L (ref 35–126)
ALT SERPL-CCNC: 34 IU/L (ref 11–54)
ANION GAP SERPL CALC-SCNC: 12 MEQ/L (ref 3–15)
AST SERPL-CCNC: 32 IU/L (ref 15–41)
BACTERIA BLD CULT: NORMAL
BACTERIA BLD CULT: NORMAL
BASOPHILS # BLD: 0.03 K/UL (ref 0.01–0.1)
BASOPHILS NFR BLD: 0.4 %
BILIRUB SERPL-MCNC: 0.9 MG/DL (ref 0.3–1.2)
BUN SERPL-MCNC: 21 MG/DL (ref 8–20)
CALCIUM SERPL-MCNC: 8.8 MG/DL (ref 8.9–10.3)
CHLORIDE SERPL-SCNC: 102 MEQ/L (ref 98–109)
CO2 SERPL-SCNC: 23 MEQ/L (ref 22–32)
CREAT SERPL-MCNC: 0.6 MG/DL (ref 0.6–1.1)
DIFFERENTIAL METHOD BLD: ABNORMAL
EOSINOPHIL # BLD: 0.14 K/UL (ref 0.04–0.36)
EOSINOPHIL NFR BLD: 1.6 %
ERYTHROCYTE [DISTWIDTH] IN BLOOD BY AUTOMATED COUNT: 12.3 % (ref 11.7–14.4)
GFR SERPL CREATININE-BSD FRML MDRD: >60 ML/MIN/1.73M*2
GLUCOSE BLD-MCNC: 190 MG/DL (ref 70–99)
GLUCOSE BLD-MCNC: 234 MG/DL (ref 70–99)
GLUCOSE BLD-MCNC: 268 MG/DL (ref 70–99)
GLUCOSE BLD-MCNC: 384 MG/DL (ref 70–99)
GLUCOSE SERPL-MCNC: 230 MG/DL (ref 70–99)
HCT VFR BLDCO AUTO: 38.9 % (ref 35–45)
HGB BLD-MCNC: 13.2 G/DL (ref 11.8–15.7)
IMM GRANULOCYTES # BLD AUTO: 0.14 K/UL (ref 0–0.08)
IMM GRANULOCYTES NFR BLD AUTO: 1.6 %
LYMPHOCYTES # BLD: 1.63 K/UL (ref 1.2–3.5)
LYMPHOCYTES NFR BLD: 19.2 %
MCH RBC QN AUTO: 31.7 PG (ref 28–33.2)
MCHC RBC AUTO-ENTMCNC: 33.9 G/DL (ref 32.2–35.5)
MCV RBC AUTO: 93.3 FL (ref 83–98)
MONOCYTES # BLD: 0.7 K/UL (ref 0.28–0.8)
MONOCYTES NFR BLD: 8.2 %
NEUTROPHILS # BLD: 5.87 K/UL (ref 1.7–7)
NEUTS SEG NFR BLD: 69 %
NRBC BLD-RTO: 0 %
PDW BLD AUTO: 10.4 FL (ref 9.4–12.3)
PLATELET # BLD AUTO: 262 K/UL (ref 150–369)
POCT TEST: ABNORMAL
POTASSIUM SERPL-SCNC: 3.8 MEQ/L (ref 3.6–5.1)
PROT SERPL-MCNC: 5.7 G/DL (ref 6–8.2)
RBC # BLD AUTO: 4.17 M/UL (ref 3.93–5.22)
SODIUM SERPL-SCNC: 137 MEQ/L (ref 136–144)
WBC # BLD AUTO: 8.51 K/UL (ref 3.8–10.5)

## 2021-04-16 PROCEDURE — 63700000 HC SELF-ADMINISTRABLE DRUG: Performed by: HOSPITALIST

## 2021-04-16 PROCEDURE — 36415 COLL VENOUS BLD VENIPUNCTURE: CPT | Performed by: INTERNAL MEDICINE

## 2021-04-16 PROCEDURE — 63600000 HC DRUGS/DETAIL CODE: Performed by: HOSPITALIST

## 2021-04-16 PROCEDURE — 80053 COMPREHEN METABOLIC PANEL: CPT | Performed by: INTERNAL MEDICINE

## 2021-04-16 PROCEDURE — 63700000 HC SELF-ADMINISTRABLE DRUG: Performed by: INTERNAL MEDICINE

## 2021-04-16 PROCEDURE — 20600000 HC ROOM AND CARE INTERMEDIATE/TELEMETRY

## 2021-04-16 PROCEDURE — 99232 SBSQ HOSP IP/OBS MODERATE 35: CPT | Performed by: HOSPITALIST

## 2021-04-16 PROCEDURE — 97116 GAIT TRAINING THERAPY: CPT | Mod: GP,CQ

## 2021-04-16 PROCEDURE — 97535 SELF CARE MNGMENT TRAINING: CPT | Mod: GO

## 2021-04-16 PROCEDURE — 85025 COMPLETE CBC W/AUTO DIFF WBC: CPT | Performed by: INTERNAL MEDICINE

## 2021-04-16 RX ORDER — INSULIN GLARGINE 100 [IU]/ML
15 INJECTION, SOLUTION SUBCUTANEOUS ONCE
Status: COMPLETED | OUTPATIENT
Start: 2021-04-16 | End: 2021-04-16

## 2021-04-16 RX ADMIN — CITALOPRAM HYDROBROMIDE 5 MG: 10 TABLET ORAL at 08:28

## 2021-04-16 RX ADMIN — Medication 2000 UNITS: at 17:15

## 2021-04-16 RX ADMIN — INSULIN GLARGINE 15 UNITS: 100 INJECTION, SOLUTION SUBCUTANEOUS at 17:50

## 2021-04-16 RX ADMIN — BUTALBITAL, ACETAMINOPHEN AND CAFFEINE 1 TABLET: 50; 325; 40 TABLET ORAL at 20:18

## 2021-04-16 RX ADMIN — ENOXAPARIN SODIUM 50 MG: 60 INJECTION SUBCUTANEOUS at 17:15

## 2021-04-16 RX ADMIN — PREGABALIN 150 MG: 75 CAPSULE ORAL at 08:28

## 2021-04-16 RX ADMIN — MOMETASONE FUROATE AND FORMOTEROL FUMARATE DIHYDRATE 2 PUFF: 200; 5 AEROSOL RESPIRATORY (INHALATION) at 17:16

## 2021-04-16 RX ADMIN — INSULIN ASPART 8 UNITS: 100 INJECTION, SOLUTION INTRAVENOUS; SUBCUTANEOUS at 17:15

## 2021-04-16 RX ADMIN — MOMETASONE FUROATE AND FORMOTEROL FUMARATE DIHYDRATE 2 PUFF: 200; 5 AEROSOL RESPIRATORY (INHALATION) at 06:10

## 2021-04-16 RX ADMIN — PREGABALIN 150 MG: 75 CAPSULE ORAL at 20:16

## 2021-04-16 RX ADMIN — INSULIN ASPART 6 UNITS: 100 INJECTION, SOLUTION INTRAVENOUS; SUBCUTANEOUS at 12:44

## 2021-04-16 RX ADMIN — INSULIN ASPART 8 UNITS: 100 INJECTION, SOLUTION INTRAVENOUS; SUBCUTANEOUS at 08:29

## 2021-04-16 RX ADMIN — OXYCODONE HYDROCHLORIDE AND ACETAMINOPHEN 500 MG: 500 TABLET ORAL at 08:28

## 2021-04-16 RX ADMIN — ATORVASTATIN CALCIUM 20 MG: 20 TABLET, FILM COATED ORAL at 20:17

## 2021-04-16 RX ADMIN — INSULIN GLARGINE 18 UNITS: 100 INJECTION, SOLUTION SUBCUTANEOUS at 08:32

## 2021-04-16 RX ADMIN — INSULIN ASPART 8 UNITS: 100 INJECTION, SOLUTION INTRAVENOUS; SUBCUTANEOUS at 12:44

## 2021-04-16 RX ADMIN — BUTALBITAL, ACETAMINOPHEN AND CAFFEINE 1 TABLET: 50; 325; 40 TABLET ORAL at 14:56

## 2021-04-16 RX ADMIN — INSULIN ASPART 2 UNITS: 100 INJECTION, SOLUTION INTRAVENOUS; SUBCUTANEOUS at 08:29

## 2021-04-16 RX ADMIN — OXYCODONE HYDROCHLORIDE AND ACETAMINOPHEN 500 MG: 500 TABLET ORAL at 17:15

## 2021-04-16 RX ADMIN — INSULIN ASPART 10 UNITS: 100 INJECTION, SOLUTION INTRAVENOUS; SUBCUTANEOUS at 17:14

## 2021-04-16 RX ADMIN — ENOXAPARIN SODIUM 50 MG: 60 INJECTION SUBCUTANEOUS at 06:09

## 2021-04-16 RX ADMIN — GUAIFENESIN 600 MG: 600 TABLET, EXTENDED RELEASE ORAL at 08:28

## 2021-04-16 RX ADMIN — DEXAMETHASONE SODIUM PHOSPHATE 6 MG: 4 INJECTION, SOLUTION INTRA-ARTICULAR; INTRALESIONAL; INTRAMUSCULAR; INTRAVENOUS; SOFT TISSUE at 08:28

## 2021-04-16 RX ADMIN — GUAIFENESIN 600 MG: 600 TABLET, EXTENDED RELEASE ORAL at 20:17

## 2021-04-16 RX ADMIN — INSULIN ASPART 4 UNITS: 100 INJECTION, SOLUTION INTRAVENOUS; SUBCUTANEOUS at 23:14

## 2021-04-16 ASSESSMENT — COGNITIVE AND FUNCTIONAL STATUS - GENERAL
DRESSING REGULAR UPPER BODY CLOTHING: 4 - NONE
STANDING UP FROM CHAIR USING ARMS: 4 - NONE
EATING MEALS: 4 - NONE
HELP NEEDED FOR PERSONAL GROOMING: 4 - NONE
MOVING TO AND FROM BED TO CHAIR: 4 - NONE
DRESSING REGULAR LOWER BODY CLOTHING: 4 - NONE
HELP NEEDED FOR BATHING: 3 - A LITTLE
CLIMB 3 TO 5 STEPS WITH RAILING: 3 - A LITTLE
TOILETING: 4 - NONE
WALKING IN HOSPITAL ROOM: 3 - A LITTLE

## 2021-04-16 NOTE — PROGRESS NOTES
Infectious Disease Progress Note    Patient Name: Maylin Stiles  MR#: [id number]  : 1960  Admission Date: 2021  Date: 21   Time: 11:24 AM   Author: ANJELICA Glass    Major Events:     Antibiotics:        Subjective  Patient states she is feeling better today and not coughing much anymore, got washed up today and sitting up in the chair, d/w nursing and diminished oxygen requirements    Review of Systems    Pertinent items are noted in HPI.    Objective     Vital Signs:    Patient Vitals for the past 72 hrs:   BP Temp Temp src Pulse Resp SpO2   21 1104 102/60 36.5 °C (97.7 °F) Oral 76 18 96 %   21 0950 -- -- -- -- 18 95 %   21 0800 -- -- -- 63 -- --   21 0400 (!) 114/57 37.3 °C (99.1 °F) Oral 62 18 94 %   21 0000 109/63 37.1 °C (98.8 °F) Oral 62 18 95 %   04/15/21 2000 106/63 36.9 °C (98.4 °F) Oral 74 18 97 %   04/15/21 1700 115/69 37.1 °C (98.8 °F) Oral (!) 59 18 96 %   04/15/21 1600 -- -- -- 63 -- --   04/15/21 1425 -- -- -- -- -- 93 %   04/15/21 1405 -- -- -- -- -- 97 %   04/15/21 1359 -- -- -- -- -- 93 %   04/15/21 1300 -- -- -- -- -- 98 %   04/15/21 1200 -- -- -- 77 -- --   04/15/21 1143 (!) 105/54 37.3 °C (99.2 °F) Oral 62 18 98 %   04/15/21 0900 (!) 99/53 -- -- -- -- --   04/15/21 0805 -- -- -- -- 18 92 %   04/15/21 0736 (!) 82/65 36.9 °C (98.5 °F) Oral 71 18 94 %   04/15/21 0530 (!) 114/57 37.1 °C (98.8 °F) Oral -- 19 94 %   04/15/21 0300 -- -- -- 63 -- --   04/15/21 0000 99/69 36.8 °C (98.2 °F) Oral 60 20 93 %   21 2000 128/64 36.7 °C (98.1 °F) Oral 63 20 96 %   21 1900 -- -- -- 60 -- --   21 1600 (!) 109/59 36.7 °C (98 °F) Oral 69 20 95 %   21 1356 110/61 -- -- 73 -- 96 %   21 1335 109/60 -- -- 72 -- 97 %   21 1200 -- -- -- 72 -- --   21 1144 107/61 36.6 °C (97.9 °F) Axillary 66 20 95 %   21 0855 -- -- -- -- -- 92 %   21 0844 113/62 37.3 °C (99.2 °F) Oral 62 20 95 %   21 0400 -- -- --  "87 -- --   21 0000 (!) 99/58 36.8 °C (98.2 °F) Oral 60 20 92 %   21 2030 122/74 36.8 °C (98.2 °F) Oral -- 20 95 %   21 -- -- -- 64 -- --   21 1900 -- -- -- 60 -- --   21 1507 112/66 37 °C (98.6 °F) Oral 66 20 94 %   21 1451 109/66 37.1 °C (98.8 °F) Oral 66 -- 94 %   21 1437 107/61 37.1 °C (98.7 °F) Oral 67 -- 94 %   21 1420 99/61 37 °C (98.6 °F) Oral 67 20 96 %   21 1200 107/61 37 °C (98.6 °F) Oral 66 20 95 %       Temp (72hrs), Av.9 °C (98.5 °F), Min:36.5 °C (97.7 °F), Max:37.3 °C (99.2 °F)      Physical Exam:    Visit Vitals  /60 (BP Location: Right upper arm, Patient Position: Lying)   Pulse 76   Temp 36.5 °C (97.7 °F) (Oral)   Resp 18   Ht 1.676 m (5' 6\")   Wt 99.8 kg (220 lb)   LMP  (LMP Unknown)   SpO2 96%   BMI 35.51 kg/m²     General appearance: alert, appears stated age, cooperative and no distress  Eyes: anicteric  Lungs: diminished breath sounds bilateral and nonlabored, no cough observed  Abdomen: soft, ND, NT  Neurologic: Mental status: Alert, oriented, thought content appropriate    Lines, Drains, Airways, Wounds:  Peripheral IV 21 Right Forearm (Active)   Number of days: 7       Labs:    CBC Results       04/16/21 04/15/21 04/14/21                    0618 0549 0542         WBC 8.51 7.16 6.40         RBC 4.17 4.28 4.19         HGB 13.2 13.4 13.3         HCT 38.9 40.1 39.0         MCV 93.3 93.7 93.1         MCH 31.7 31.3 31.7         MCHC 33.9 33.4 34.1          227 224                   BMP Results       04/16/21 04/15/21 04/14/21                    0617 0549 0542          138 138         K 3.8 3.9 3.6         Cl 102 101 103         CO2 23 24 22         Glucose 230 160 138         BUN 21 20 23         Creatinine 0.6 0.7 0.8         Calcium 8.8 8.8 8.8         Anion Gap 12 13 13         EGFR >60.0 >60.0 >60.0                   PT/PTT Results       21           PT 12.8           INR " 1.0           PTT 36           Comment for INR at 1956 on 04/09/21: INR has no defined significance when PT is within Reference Range.    Comment for PTT at 1956 on 04/09/21: The Standard Therapeutic Range for Heparin is 68 to 101 seconds.      UA Results    No lab values to display.     Lactate Results    No lab values to display.         Microbiology Results     Procedure Component Value Units Date/Time    Blood Culture Blood, Venous [[id number]] Collected: 04/11/21 1046    Specimen: Blood, Venous Updated: 04/15/21 1500     Culture No growth at 96 hours    Blood Culture Blood, Venous [[id number]] Collected: 04/11/21 1046    Specimen: Blood, Venous Updated: 04/15/21 1500     Culture No growth at 96 hours          Pathology Results     ** No results found for the last 720 hours. **          Echo:         Imaging:    Radiology Imaging    XR CHEST 1 VW    Narrative  CLINICAL HISTORY: Respiratory failure, noncardiac cause    COMMENT: A single AP radiograph of the chest is obtained . Comparison is made to  prior exam of 4/9/2021. The cardiomediastinal silhouette is top normal.  Elevated right hemidiaphragm.  There is diffuse interstitial infiltrates with  worsening.  No naga pleural effusions.  No pneumothorax.    --    Impression  Diffuse interstitial infiltrates with worsening.      Assessment     COVID 19 PNA with improving hypoxic respiratory failure  - on 5 liters salter NC  - CXR with Diffuse interstitial infiltrates with worsening   - afebrile  - no leukocytosis  - quantiferon negative   - on steroids   - completed course of remdesivir on 4/14/21   - blood sterile            Plan     Continue supportive care, steroid mgmt per primary team, since oxygen requirements are improving no clear indication for experimental therapy at this time, please notify ID team if any urgent issues or worsening oxygen requirements

## 2021-04-16 NOTE — PROGRESS NOTES
Hospital Medicine Service -  Daily Progress Note       SUBJECTIVE   Interval History:   Patient seen and examined at bed side this afternoon  .  She was symptomatic during her home oxygen eval -will repeat again tomorrow .    Denies any chest pain /palpitation / no over night event noted .     OBJECTIVE      Vital signs in last 24 hours:  Temp:  [36.5 °C (97.7 °F)-37.3 °C (99.1 °F)] 37.1 °C (98.7 °F)  Heart Rate:  [62-87] 66  Resp:  [18] 18  BP: (102-121)/(57-69) 121/69  No intake or output data in the 24 hours ending 04/16/21 8231    PHYSICAL EXAMINATION      Physical Exam    HEENT : Normocephalic / vision intact .  LUNGS : Decreased air entry in both lungs   HEART : Regular / no Murmur   ABDOMEN : Soft / non tender / audible BS   EXTREMITY :  No edema   MUSCULOSKELETAL : No deformity   CNS : AAO X 3 , moving all limbs equally   BEHAVIORAL : Co-operative   SKIN .: No erythema / rash / ulcer      LINES, CATHETERS, DRAINS, AIRWAYS, AND WOUNDS   Lines, Drains, and Airways:  Wounds (agree with documentation and present on admission):  Peripheral IV 04/09/21 Right Forearm (Active)   Number of days: 3         Comments:      LABS / IMAGING / TELE      Labs  Lytes normal     No results found for: COVID19    Imaging  CXR  On admission - with multifocal pneumonia     ECG/Telemetry  NSR     ASSESSMENT AND PLAN      * Pneumonia due to COVID-19 virus  Assessment & Plan  With associated hypoxia, the patient saturation is 95% on 2L/min supplemental oxygen.  Found to be COVID-19 positive on 4/9 , her  already has it.    IV Decadron, prn Albuterol.  Pulmonary and ID are following     Further details in acute respiratory failure with hypoxia section.    Sepsis (CMS/HCC)  Assessment & Plan  Patient with sepsis, secondary to COVID-19 pneumonia.  Draw blood cultures.  Follow up on blood cultures.  ID following.    Acute respiratory failure with hypoxia (CMS/HCC)  Assessment & Plan  Acute respiratory failure with hypoxia,  currently requiring 4 L of oxygen via nasal cannula to keep oxygen levels above 88.    This is secondary to COVID-19 pneumonia.  Continue pulmonary toileting, incentive spirometry, frequent position shifts.     Remdesivir initiated D # 5/5  D # 8/10  of IV dexamethasone   Continue 0.5mg/kg of Lovenox given vascular complications of Covid.  Started on Dulera for intractable cough   Increasing hypoxia today needing nonheated high flow at 13-15 liter with oxygen saturation 93-94 %   Continue to titrate oxygen to keep sat >92 %   Decadron 6 mg IV, 10-day course.or once hypoxia resolves    Started to wean off from oxygen on 4/14 - monitor   Home oxygen eval prior to discharge .      Fibromyalgia  Assessment & Plan  Continue Lyrica.    Type 2 diabetes mellitus, without long-term current use of insulin (CMS/[facility])  Assessment & Plan      HbA1c of 8.5  Uncontrolled BG while on IV steroids   Insulin adjusted again on 4/14  Holding oral med for now        VTE Assessment: Padua VTE Score: 2  VTE Prophylaxis:  Current anticoagulants:  enoxaparin (LOVENOX) syringe 50 mg, subcutaneous, q12h (6a, 6p)      Code Status: Full Code  Palliative Care Screening Score: 0   Estimated Discharge Date: 4/18/2021   Disposition Planning: Home oxygen eval tomorrow again      Delia Costello MD  4/16/2021

## 2021-04-16 NOTE — PROGRESS NOTES
Patient: Maylin Stiles  Location: [place] [address]  MRN: [id number]  Today's date: 4/16/2021     Pt EOB at end of session with PT present for treatment session. NAD. All verbalized needs met.     Maylin Jarvis is a 60 y.o. female admitted on 4/9/2021 with Hypoxia [R09.02]  Pneumonia due to COVID-19 virus [U07.1, J12.82]. Principal problem is Pneumonia due to COVID-19 virus.    Past Medical History  Maylin Jarvis has a past medical history of Depression with anxiety, Diabetes mellitus (CMS/[facility]), and Fibromyalgia.    History of Present Illness   Pt admit to ER with cough, myalgias, fever, fatigue; COVID-19 test is POSITIVE. Her CXR shows B/L small infiltrates           OT Vitals    Date/Time Pulse SpO2 Pt Activity O2 Therapy O2 Del Method O2 Flow Rate BP BP Location BP Method Pt Position Observations [facility]   04/16/21 1427 87 94 % At rest Supplemental oxygen Nasal cannula 5 L/min 119/60 Right upper arm Automatic Sitting -- NMD   04/16/21 1435 -- 88 % Walking after ambulation from bathroom Supplemental oxygen Nasal cannula 5 L/min -- -- -- -- -- NMD   04/16/21 1436 -- 92 % At rest Supplemental oxygen Nasal cannula 5 L/min -- -- -- -- after 1 min seated rest following ambulation NMD      OT Pain    Date/Time Pain Type Pref Pain Scale Side Orientation Rating: Activity Rating: Rest [facility]   04/16/21 1427 Pain Assessment word (verbal rating pain scale) -- middle back and mild headache 4 - moderate pain 4 - moderate pain NMD          Prior Living Environment      Most Recent Value   People in Home  spouse, child(kannan), adult   Living Arrangements  house   Current Living Arrangements  home/apartment/condo   Home Accessibility  stairs to enter home (Group), stairs within home (Group)   Number of Stairs, Main Entrance  2   Number of Stairs, Within Home, Primary  12          Prior Level of Function      Most Recent Value   Dominant Hand  right   Ambulation  independent   Transferring  independent   Toileting  independent    Bathing  independent   Dressing  independent   Eating  independent   Prior Level of Function Comment  indep with ADL's and mobility without AD   Assistive Device/Animal Currently Used at Home  none          Occupational Profile      Most Recent Value   Occupational History/Life Experiences  homemaker, starting new job as  shortly   Environmental Supports and Barriers  lives with , also COVID+, working from home   Patient Goals  to return home          OT Evaluation and Treatment - 04/16/21 1427        OT Time Calculation    Start Time  1427     Stop Time  1441     Time Calculation (min)  14 min        Session Details    Document Type  daily treatment/progress note     Mode of Treatment  occupational therapy        General Information    Patient Profile Reviewed  yes     Patient/Family/Caregiver Comments/Observations  RN cleared for therapy     General Observations of Patient  Pt received EOB, agreeable to therapy     Existing Precautions/Restrictions  contact;droplet;oxygen therapy device and L/min    COVID+       Bed Mobility    Comment (Bed Mobility)  OOB throughout session        Transfers    Transfers  toilet transfer;shower transfer        Sit to Stand Transfer    [place], Sit to Stand Transfer  independent     Assistive Device  none        Stand to Sit Transfer    [place], Stand to Sit Transfer  independent     Assistive Device  none        Toilet Transfer    Transfer Technique  sit-stand;stand-sit     [place], Toilet Transfer  independent     Assistive Device  none     Comment  to and from standard toilet in bathroom        Shower Transfer    Transfer Technique  --    step over, front entry    [place], Shower Transfer  modified independence     Assistive Device  other (see comments)    UUE support on wall    Comment  Pt demonstrated safe shower transfer technique, no LOB.         Safety Issues, Functional Mobility    Impairments Affecting Function (Mobility)   endurance/activity tolerance    oxygen demands       Lower Body Dressing    Self-Performance  dons/doffs left sock;dons/doffs right sock     [place]ansom  independent     Position  edge of bed sitting     Comment  figure 4 technique        ADL Interventions    Energy Conservation Techniques  activity adapted to sitting;activity pacing encouraged;asking for necessary assistance promoted;breathing techniques encouraged;planning ahead encouraged;prioritizing activities promoted;regular rest breaks encouraged;storing supplies in easy reach encouraged        AM-PAC (TM) - ADL (Current Function)    Putting on and taking off regular lower body clothing?  4 - None     Bathing?  3 - A Little    S for safety 2/2 O2 demands    Toileting?  4 - None     Putting on/taking off regular upper body clothing?  4 - None     How much help for taking care of personal grooming?  4 - None     Eating meals?  4 - None     AM-PAC (TM) ADL Score  23        Therapy Assessment/Plan (OT)    Rehab Potential (OT)  good, to achieve stated therapy goals     Therapy Frequency (OT)  3-5 times/wk        Progress Summary (OT)    Daily Outcome Statement (OT)  OT treat complete. Pt has met or partially met all acute OT goals at this time. Pt continues to be limited by oxygen demands, activity tolerance. Pt educated on energy conservation techniques for discharge home. Pt demonstrating independence/modified independence for all transfers and LB dressing.      Symptoms Noted During/After Treatment  fatigue        Therapy Plan Review/Discharge Plan (OT)    OT Recommended Discharge Disposition  home     Anticipated Equipment Needs At Discharge (OT)  shower chair                  Education provided this session. See the Patient Education summary report for full details.         OT Goals      Most Recent Value   Transfer Goal 1   Activity/Assistive Device  toilet at 04/14/2021 1453   [place]  independent at 04/14/2021 1453   Time Frame  by discharge at  04/14/2021 1453   Progress/Outcome  goal met at 04/16/2021 1427   Dressing Goal 1   Activity/Adaptive Equipment  dressing skills, all at 04/14/2021 1453   [place]  independent at 04/14/2021 1453   Time Frame  by discharge at 04/14/2021 1453   Progress/Outcome  goal partially met at 04/16/2021 1427   Problem Specific Goal 1   Problem-Specific Goal 1  increase activity tolerance during self cares with O2 sat above 90% at 04/14/2021 1453   Time Frame  by discharge at 04/14/2021 1453   Progress/Outcome  goal partially met [dropped to 88% but recovered to 92% <1min seated rest] at 04/16/2021 1427

## 2021-04-16 NOTE — PROGRESS NOTES
Patient: Maylin Stiles  Location: [address]  MRN: [id number]  Today's date: 4/16/2021     Pt left supine in bed and resting comfortably with all lines arranged, incontinence pad underneath, call bell and personal items within reach and bed alarm activated. Nurse aware of pt's performance and positioning.          Maylin Jarvis is a 60 y.o. female admitted on 4/9/2021 with Hypoxia [R09.02]  Pneumonia due to COVID-19 virus [U07.1, J12.82]. Principal problem is Pneumonia due to COVID-19 virus.    Past Medical History  Maylin Jarvis has a past medical history of Depression with anxiety, Diabetes mellitus (CMS/[facility]), and Fibromyalgia.    History of Present Illness   Pt admit to ER with cough, myalgias, fever, fatigue; COVID-19 test is POSITIVE. Her CXR shows B/L small infiltrates           PT Vitals    Date/Time Pulse SpO2 Pt Activity O2 Therapy O2 Del Method O2 Flow Rate BP BP Location BP Method Pt Position Observations [facility]   04/16/21 1421 87 94 % At rest Supplemental oxygen Nasal cannula 5 L/min 119/60 Right upper arm Automatic Sitting --    04/16/21 1427 87 94 % At rest Supplemental oxygen Nasal cannula 5 L/min 119/60 Right upper arm Automatic Sitting -- [facility]   04/16/21 1435 -- 88 % Walking after ambulation from bathroom Supplemental oxygen Nasal cannula 5 L/min -- -- -- -- -- NMD   04/16/21 1436 -- 92 % At rest Supplemental oxygen Nasal cannula 5 L/min -- -- -- -- after 1 min seated rest following ambulation NMD      PT Pain    Date/Time Pain Type Pref Pain Scale Side Orientation Location Rating: Rest Rating: Activity Rating: Rest Onset/Duration [facility]   04/16/21 1421 Pain Assessment number (Numeric Rating Pain Scale) -- back mid  2 5 -- -- prescribed exercises encouraged;position adjusted    04/16/21 1427 Pain Assessment word (verbal rating pain scale) -- middle back and mild headache -- -- 4 - moderate pain 4 - moderate pain -- NMD          Prior Living Environment      Most Recent Value   People  in Home  spouse, child(kannan), adult   Living Arrangements  house   Current Living Arrangements  home/apartment/condo   Home Accessibility  stairs to enter home (Group), stairs within home (Group)   Number of Stairs, Main Entrance  2   Number of Stairs, Within Home, Primary  12          Prior Level of Function      Most Recent Value   Dominant Hand  right   Ambulation  independent   Transferring  independent   Toileting  independent   Bathing  independent   Dressing  independent   Eating  independent   Prior Level of Function Comment  indep with ADL's and mobility without AD   Assistive Device/Animal Currently Used at Home  none          PT Evaluation and Treatment - 04/16/21 1421        PT Time Calculation    Start Time  1421     Stop Time  1448     Time Calculation (min)  27 min        Session Details    Document Type  daily treatment/progress note     Mode of Treatment  individual therapy;physical therapy        General Information    Patient Profile Reviewed  yes     General Observations of Patient  Pt rec'd at EOB     Existing Precautions/Restrictions  contact;droplet;oxygen therapy device and L/min        Bed Mobility    Bed Mobility  bed mobility (all) activities     [place], All Bed Mobility Activities  independent        Sit to Stand Transfer    [place], Sit to Stand Transfer  independent     Verbal Cues  safety     Assistive Device  none        Stand to Sit Transfer    [place], Stand to Sit Transfer  independent     Verbal Cues  safety     Assistive Device  none        Gait Training    [place], Gait  supervision     Assistive Device  none     Distance in Feet  30 feet     Pattern (Gait)  step-through     Deviations/Abnormal Patterns (Gait)  base of support, wide;gait speed decreased;step length decreased     Advanced Gait Activity  carrying objects while walking     Comment (Gait/Stairs)  LEBLANC -fatigue         Carrying Objects    [place]  independent     Distance  30 feet         Stairs Training    [place], Stairs  not tested        Therapeutic Exercise    Therapeutic Exercise  lower extremity        Lower Extremity (Therapeutic Exercise)    Exercise Position/Type  standing     General Exercise  marching while standing;heel raises;bilateral;ankle pumps     Reps and Sets  1/10        AM-PAC (TM) - Mobility (Current Function)    Turning from your back to your side while in a flat bed without using bedrails?  4 - None     Moving from lying on your back to sitting on the side of a flat bed without using bedrails?  4 - None     Moving to and from a bed to a chair?  4 - None     Standing up from a chair using your arms?  4 - None     To walk in a hospital room?  3 - A Little     Climbing 3-5 steps with a railing?  3 - A Little     AM-PAC (TM) Mobility Score  22        Therapy Assessment/Plan (PT)    Rehab Potential (PT)  good, to achieve stated therapy goals     Therapy Frequency (PT)  3-5 times/wk     Planned Therapy Interventions (PT)  balance training;gait training;home exercise program;stretching;strengthening        Progress Summary (PT)    Daily Outcome Statement (PT)  Pt fatigues quickly, mild LEBLANC on 5 L via NC with minimal exertion, would benefit from continued skilled PT to increase endurance toward PLOF, EDu with importance of graduated activity challenges      Symptoms Noted During/After Treatment  fatigue        Therapy Plan Review/Discharge Plan (PT)    PT Recommended Discharge Disposition  home with assist     Anticipated Equipment Needs at Discharge (PT)  none                       Education provided this session. See the Patient Education summary report for full details.    PT Goals      Most Recent Value   Gait Training Goal 1   Activity/Assistive Device  gait (walking locomotion), no assistive device at 04/14/2021 1357   [place]  independent at 04/14/2021 1357   Distance  100 at 04/14/2021 1357   Time Frame  by discharge at 04/14/2021 1357   Progress/Outcome  goal  ongoing at 04/15/2021 1359   Stairs Goal 1   Activity/Assistive Device  ascending stairs, descending stairs, using handrail, left at 04/14/2021 1357   [place]  modified independence at 04/14/2021 1357   Number of Stairs  8 at 04/14/2021 1357   Time Frame  by discharge at 04/14/2021 1357   Progress/Outcome  goal ongoing at 04/15/2021 1350

## 2021-04-16 NOTE — PROGRESS NOTES
Pulmonary Progress Note      Subjective:  Resting in bed. On 5L salter cannula.  Respirations not labored at rest. Feeling better today.  Breathing is slowly improving. Continues to have a dry cough.  No sputum production thus far. Has nasal congestion.  Also notes nares are dry from O2 therapy.  Has been proning on her own & reports compliance w/ incentive spirometry.  Denies any fevers, chills, chest congestion.  Chart/labs/vitals reviewed.    Allergies: Patient has no known allergies.    Medications:  Current Facility-Administered Medications   Medication Dose Route Frequency Provider Last Rate Last Admin   • acetaminophen (TYLENOL) tablet 650 mg  650 mg oral q4h PRN Delia Costello MD   650 mg at 04/15/21 2140   • albuterol HFA (VENTOLIN HFA) 90 mcg/actuation inhaler 2 puff  2 puff inhalation q6h PRN Robby Norris MD   2 puff at 04/13/21 0928   • ascorbic acid (VITAMIN C) tablet 500 mg  500 mg oral BID with meals Fahad Mcmahan MD   500 mg at 04/16/21 0828   • atorvastatin (LIPITOR) tablet 20 mg  20 mg oral Nightly Robby Norris MD   20 mg at 04/15/21 2141   • benzonatate (TESSALON) capsule 100 mg  100 mg oral q4h PRN Delia Costello MD   100 mg at 04/14/21 0008   • butalbital-acetaminophen-caff (FIORICET, ESGIC) per tablet 1 tablet  1 tablet oral q6h PRN Fahad Mcmahan MD   1 tablet at 04/16/21 1456   • cholecalciferol (vitamin D3) tablet 2,000 Units  2,000 Units oral Daily with dinner Fahad Mcmahan MD   2,000 Units at 04/15/21 1737   • citalopram (celeXA) tablet 5 mg  5 mg oral Daily Robby Norris MD   5 mg at 04/16/21 0828   • dexamethasone (DECADRON) injection 6 mg  6 mg intravenous Daily Robby Norris MD   6 mg at 04/16/21 0828   • enoxaparin (LOVENOX) syringe 50 mg  50 mg subcutaneous q12h (6a, 6p) Robby Norris MD   50 mg at 04/16/21 0609   • guaiFENesin (MUCINEX) 12 hr ER tablet 600 mg  600 mg oral BID Fahad Mcmahan MD   600 mg at 04/16/21 0828   • insulin aspart U-100  (NovoLOG) pen 2-12 Units  2-12 Units subcutaneous With meals & nightly Delia Costello MD   6 Units at 04/16/21 1244   • insulin aspart U-100 (NovoLOG) pen 8 Units  8 Units subcutaneous TID with meals Delia Costello MD   8 Units at 04/16/21 1244   • insulin glargine U-100 (LANTUS SOLOSTAR/BASAGLAR) pen 18 Units  18 Units subcutaneous Daily Delia Costello MD   18 Units at 04/16/21 0832   • mometasone-formoterol (DULERA 200) 200-5 mcg/actuation inhaler 2 puff  2 puff inhalation BID (6a, 6p) Mayra Hester MD   2 puff at 04/16/21 0610   • pregabalin (LYRICA) capsule 150 mg  150 mg oral BID Robby Norris MD   150 mg at 04/16/21 0828   • sodium chloride (OCEAN) 0.65 % nasal spray 2 spray  2 spray Each Nostril 2x daily PRN Laisha Negrete PA C          Review of Systems:  Unchanged except as noted in HPI    Vital signs in last 24 hours:  Temp:  [36.5 °C (97.7 °F)-37.3 °C (99.1 °F)] 37.1 °C (98.7 °F)  Heart Rate:  [59-87] 66  Resp:  [18] 18  BP: (102-121)/(57-69) 121/69    Input/Ouput in Last 24 hours:  No intake or output data in the 24 hours ending 04/16/21 1633    Physical Exam  General: Well appearing female, NAD, requiring 5L [place] cannula  HEENT: Limited exam, EOMI, PERRL, anicteric sclera   Neck: Supple, no JVD, no tug or stridor, trach midline  Cardiac: RRR, +S1/S2, no murmur appreciated  Lungs: Normal effort, respirations not labored  Decreased breath sounds bilaterally  Scattered fine inspiratory rales posteriorly  No wheeze/rhonchi  +Dry, non-productive cough w/ deep breathing maneuvers  Abdomen: Soft, NT/ND, +BS, no rebound/guarding   : Deferred  Extremities: No edema, clubbing, cyanosis  Musculoskeletal: No joint deformity  Vascular: No ischemia  Neuro: AAOx3, nonfocal  Skin: Warm, limited exam, defer to attending/nursing  Psych: Cooperative, appropriate    Labs:    Lab Results   Component Value Date    WBC 8.51 04/16/2021    HGB 13.2 04/16/2021    HCT 38.9 04/16/2021     04/16/2021     ALT 34 04/16/2021    AST 32 04/16/2021     04/16/2021    K 3.8 04/16/2021     04/16/2021    CREATININE 0.6 04/16/2021    BUN 21 (H) 04/16/2021    CO2 23 04/16/2021    INR 1.0 04/09/2021    HGBA1C 8.5 (H) 04/10/2021     Imaging:  CXR 4/14/2021  IMPRESSION  Diffuse interstitial infiltrates with worsening.        ECG/Telemetry: SR @ 68      IMPRESSION AND PLAN    Acute hypoxic respiratory failure  Worsening O2 requirements since presentation likely secondary to COVID19 infection.  - Desaturations noted overnight, requiring 10L Salter cannula.  - Weaned to 5L salter cannula this morning.  - Continue supplemental oxygen. Titrate FiO2 to maintain SpO2 >= 90%  -- Wean as condition permits.  - Will order nasal spray for congestion/dryness.  - Encouraged modified proning/incentive spirometry.  -- States she has been proning on her own.     COVID19 infection  Pneumonia  PCR+ 4/9. Chest imaging with bibasilar infiltrates.  - 6mg IV Decadron daily. Day #8/10 of therapy.   - S/p IV Remdesivir 4/14  - Continue Dulera, 200-5 mcg, 2 puffs twice daily.  - Mucinex BID.  - VTE ppx subcutaneous Lovenox 0.5 mg/kg BID.  - Repeat chest x-ray PRN.     -- Additional history per EMR/attending.     Diet: Regular consistency, cardiac diet  DVT prophylaxis: subcutaneous Lovenox  0.5 mg/kg BID  GI prophylaxis: Protonix  Code status: Full code  Case d/w: patient, RN    --patient seen and examined  --chart/labs/MAR/imaging reviewed  --medical conditions on treatment and being monitored  --no other change in FH/PH/ROS  --patient counseled  --monitor response to ongoing therapy    --slowly responding to current measures    --continue deep breathing/coughing/proning maneuvers

## 2021-04-16 NOTE — PLAN OF CARE
Problem: Adult Inpatient Plan of Care  Goal: Plan of Care Review  Outcome: Progressing  Flowsheets (Taken 4/16/2021 6321)  Plan of Care Reviewed With: patient  Outcome Summary: OT treat complete. Pt has met or partially met all acute OT goals at this time. Pt continues to be limited by oxygen demands, activity tolerance. Pt educated on energy conservation techniques for discharge home. Pt demonstrating independence/modified independence for all transfers and LB dressing.

## 2021-04-16 NOTE — PROGRESS NOTES
Brief Nutrition Assessment Reason for consult:  Follow up    Nutrition Interventions/Recommendations:   1. Continue with [id number], cardiac, NCS  diet as tolerated  2. Weekly weights  3. Consider daily multivitamin   4. Supplements if po intake <50%  5. Attached carbohydrate counting DM education to AVS along with RDN contact info 4/16/21    Monitor:  1. % PO intake  2. Diet tolerance  3. Weight changes  4. Labs-replete prn, accuchecks q 6 hrs  5. Skin integrity  6. Bowel function    Goals:         1. To consume and tolerate >/= 75% of estimated needs via goal diet  2. BG <140-180mg/dL    Clinical Course: Patient is a 60 y.o. female who was admitted on 4/9/2021 with a diagnosis of Hypoxia [R09.02]  Pneumonia due to COVID-19 virus [U07.1, J12.82]. 60 yr old female admitted with covid-on decadron and 5 L nasal cannula. CXR with Diffuse interstitial infiltrates with worsening. Afebrile.  Hx DM-accuchecks 190 to 277, noncompliant with checking BG at home, steroids likely contributing to hyperglycemia.  Full code.      lbs (2019), wt 4/10: 220lbs, and 224 lbs, 1.8% wt gain in 2 yrs.    Nutrition Focused Physical Exam:Per [facility] COVID pt visitation restrictions, RD not able to enter room. No answer during phone calls, discussed at rounds.    BMI 35.53 obese class I per stds.         Dietary Orders   (From admission, onward)             Start     Ordered    04/10/21 0938  Adult Diet Regular; Consistent Carbohydrate 1800; Cardiac (Low Sodium/Low Fat), No Concentrated Sweets; RD/LDN may adjust order  Diet effective now     Question Answer Comment   Diet Texture Regular    Diabetic Restrictions: Consistent Carbohydrate 1800    Other Restriction(s): Cardiac (Low Sodium/Low Fat)    Other Restriction(s): No Concentrated Sweets    Delegation of Authority. Diet orders written by PA/CRSamson may not be adjusted by RD/LDNs. RD/LDN may adjust order        04/10/21 0937              Weights (last 7 days)     Date/Time   Weight     "04/10/21 0110   99.8 kg (220 lb)    04/09/21 1707   102 kg (224 lb 3.2 oz)            Wt Readings from Last 3 Encounters:   04/10/21 99.8 kg (220 lb)   05/08/19 90.7 kg (200 lb)       Reason for Assessment  Reason For Assessment: diagnosis/disease state (covid)  Patient Already Seen On: 04/16/21  Diagnosis: other (see comments) (covid)    [facility] Nutrition Screen Tool  Has patient lost weight without trying?: 0-->No  Has patient been eating poorly due to decreased appetite?: 0-->No  [facility] Nutrition Screen Score: 0    Nutrition/Diet History  Intake (%): 100%  Supplemental Drinks/Foods/Additives:  (D, fish oul, Mg, Calcium, biotin, tumeric)  Food Allergies: other (see comments) (NKFA)    Physical Findings  Overall Physical Appearance:  (covid, not observed. BMI obese)  Last Bowel Movement: 04/14/21  Skin: other (see comments) (intact, no edema)    Last Bowel Movement: 04/14/21    Nutrition Order  Nutrition Order: meets nutritional requirements  Nutrition Order Comments: [id number], cardiac, NCS    Anthropometrics  Height: 167.6 cm (5' 5.98\") (per emr)      Admit Weight  Admit Weight:  (220#)    Current Weight  Weight Method: Stated  Weight: 99.8 kg (220 lb)    Ideal Body Weight (IBW)  Ideal Body Weight (IBW) (kg): 59.54  % Ideal Body Weight: 167.5    Usual Body Weight (UBW)  Usual Body Weight: 99.8 kg (220 lb) (per pt and emr 2019)  % of Usual Body Weight Assessment: not applicable  Weight Loss: unintentional  Time Frame: >1 Year (1.8% wt gain in 2 yrs)  Body Mass Index (BMI)  BMI (Calculated): 35.5  BMI (kg/m2): 35.58  BMI Assessment: BMI 35-39.9: obesity grade II                        Labs/Procedures/Meds  Lab Results Reviewed: reviewed, pertinent  Lab Results Comments: BUn 21, , [phone]    Results from last 7 days   Lab Units 04/16/21  0617 04/15/21  0549 04/14/21  0542   SODIUM mEQ/L 137 138 138   POTASSIUM mEQ/L 3.8 3.9 3.6   CHLORIDE mEQ/L 102 101 103   CO2 mEQ/L 23 24 22   BUN mg/dL 21* 20 23* "   CREATININE mg/dL 0.6 0.7 0.8   GLUCOSE mg/dL 230* 160* 138*   CALCIUM mg/dL 8.8* 8.8* 8.8*      Results from last 7 days   Lab Units 04/16/21  0617 04/15/21  0549 04/14/21  0542   ALK PHOS IU/L 54 53 53   BILIRUBIN TOTAL mg/dL 0.9 0.9 0.8   ALBUMIN g/dL 3.2* 3.4 3.3*   ALT IU/L 34 32 26   AST IU/L 32 33 32     Results from last 7 days   Lab Units 04/10/21  0445   HEMOGLOBIN A1C % 8.5*      Lab Results   Component Value Date    HGBA1C 8.5 (H) 04/10/2021     No results found for: [id number]  Lab Results   Component Value Date    CALCIUM 8.8 (L) 04/16/2021     Results from last 7 days   Lab Units 04/16/21  0618 04/15/21  0549 04/14/21  0542   WBC K/uL 8.51 7.16 6.40   HEMOGLOBIN g/dL 13.2 13.4 13.3   HEMATOCRIT % 38.9 40.1 39.0   PLATELETS K/uL 262 227 224               Results from last 7 days   Lab Units 04/12/21  0535 04/11/21  0619   MAGNESIUM mg/dL 2.1 1.6*        Diagnostic Tests/Procedures  Diagnostic Test/Procedure Reviewed: reviewed, pertinent  Diagnostic Test/Procedures Comments: covid pna    Medications  Pertinent Medications Reviewed: reviewed, pertinent  Pertinent Medications Comments: decadron, D3, Vit C, lipitor, novolog  • ascorbic acid  500 mg oral BID with meals   • atorvastatin  20 mg oral Nightly   • cholecalciferol (vitamin D3)  2,000 Units oral Daily with dinner   • citalopram  5 mg oral Daily   • dexamethasone  6 mg intravenous Daily   • enoxaparin  50 mg subcutaneous q12h (6a, 6p)   • guaiFENesin  600 mg oral BID   • insulin aspart U-100  2-12 Units subcutaneous With meals & nightly   • insulin aspart U-100  8 Units subcutaneous TID with meals   • insulin glargine U-100  18 Units subcutaneous Daily   • mometasone-formoterol  2 puff inhalation BID (6a, 6p)   • pregabalin  150 mg oral BID         Nutritional Needs Met?: Yes (po 100%)  Nutrition Status Classification: Mild nutritional compromise (L1: covid, variable po intake, 5 pts)      Date: 04/16/21  Signature: Danika Mcelroy, SUNIL, LISAN

## 2021-04-16 NOTE — PLAN OF CARE
Problem: Adult Inpatient Plan of Care  Goal: Plan of Care Review  Outcome: Progressing  Flowsheets  Taken 4/16/2021 1532 by Lamar Rutherford PTA  Outcome Summary: Pt fatigues quickly, mild LEBLANC on 5 L via NC with minimal exertion, would benefit from continued skilled PT to increase endurance toward PLOF, EDu with importance of graduated activity challenges  Taken 4/16/2021 1508 by Mayi Melo OT  Plan of Care Reviewed With: patient  Taken 4/16/2021 0247 by Radha Hernández RN  Progress: improving     Problem: Mobility Impairment  Goal: Optimal Mobility  Outcome: Progressing

## 2021-04-16 NOTE — PLAN OF CARE
Plan of Care Review  Plan of Care Reviewed With: patient  Progress: improving  Outcome Summary: Patient AAOx3, VSS. Patient maintained on 7L of salter overnight. Slept well. complaints of headache, relieved with Tylenol. Will continue to monitor.

## 2021-04-17 VITALS
TEMPERATURE: 97.8 F | BODY MASS INDEX: 35.36 KG/M2 | RESPIRATION RATE: 18 BRPM | SYSTOLIC BLOOD PRESSURE: 109 MMHG | HEART RATE: 68 BPM | DIASTOLIC BLOOD PRESSURE: 60 MMHG | HEIGHT: 66 IN | WEIGHT: 220 LBS | OXYGEN SATURATION: 97 %

## 2021-04-17 LAB
ALBUMIN SERPL-MCNC: 3.5 G/DL (ref 3.4–5)
ALP SERPL-CCNC: 58 IU/L (ref 35–126)
ALT SERPL-CCNC: 37 IU/L (ref 11–54)
ANION GAP SERPL CALC-SCNC: 11 MEQ/L (ref 3–15)
AST SERPL-CCNC: 28 IU/L (ref 15–41)
BASOPHILS # BLD: 0.03 K/UL (ref 0.01–0.1)
BASOPHILS NFR BLD: 0.3 %
BILIRUB SERPL-MCNC: 1 MG/DL (ref 0.3–1.2)
BUN SERPL-MCNC: 17 MG/DL (ref 8–20)
CALCIUM SERPL-MCNC: 8.9 MG/DL (ref 8.9–10.3)
CHLORIDE SERPL-SCNC: 104 MEQ/L (ref 98–109)
CO2 SERPL-SCNC: 24 MEQ/L (ref 22–32)
CREAT SERPL-MCNC: 0.6 MG/DL (ref 0.6–1.1)
DIFFERENTIAL METHOD BLD: ABNORMAL
EOSINOPHIL # BLD: 0.15 K/UL (ref 0.04–0.36)
EOSINOPHIL NFR BLD: 1.6 %
ERYTHROCYTE [DISTWIDTH] IN BLOOD BY AUTOMATED COUNT: 12.5 % (ref 11.7–14.4)
GFR SERPL CREATININE-BSD FRML MDRD: >60 ML/MIN/1.73M*2
GLUCOSE BLD-MCNC: 177 MG/DL (ref 70–99)
GLUCOSE BLD-MCNC: 258 MG/DL (ref 70–99)
GLUCOSE SERPL-MCNC: 180 MG/DL (ref 70–99)
HCT VFR BLDCO AUTO: 39.7 % (ref 35–45)
HGB BLD-MCNC: 13.5 G/DL (ref 11.8–15.7)
IMM GRANULOCYTES # BLD AUTO: 0.22 K/UL (ref 0–0.08)
IMM GRANULOCYTES NFR BLD AUTO: 2.3 %
LYMPHOCYTES # BLD: 1.39 K/UL (ref 1.2–3.5)
LYMPHOCYTES NFR BLD: 14.8 %
MCH RBC QN AUTO: 31.6 PG (ref 28–33.2)
MCHC RBC AUTO-ENTMCNC: 34 G/DL (ref 32.2–35.5)
MCV RBC AUTO: 93 FL (ref 83–98)
MONOCYTES # BLD: 0.72 K/UL (ref 0.28–0.8)
MONOCYTES NFR BLD: 7.7 %
NEUTROPHILS # BLD: 6.86 K/UL (ref 1.7–7)
NEUTS SEG NFR BLD: 73.3 %
NRBC BLD-RTO: 0 %
PDW BLD AUTO: 10.6 FL (ref 9.4–12.3)
PLATELET # BLD AUTO: 288 K/UL (ref 150–369)
POCT TEST: ABNORMAL
POCT TEST: ABNORMAL
POTASSIUM SERPL-SCNC: 4.1 MEQ/L (ref 3.6–5.1)
PROT SERPL-MCNC: 6.2 G/DL (ref 6–8.2)
RBC # BLD AUTO: 4.27 M/UL (ref 3.93–5.22)
SODIUM SERPL-SCNC: 139 MEQ/L (ref 136–144)
WBC # BLD AUTO: 9.37 K/UL (ref 3.8–10.5)

## 2021-04-17 PROCEDURE — 99239 HOSP IP/OBS DSCHRG MGMT >30: CPT | Mod: CR | Performed by: HOSPITALIST

## 2021-04-17 PROCEDURE — 63700000 HC SELF-ADMINISTRABLE DRUG: Performed by: INTERNAL MEDICINE

## 2021-04-17 PROCEDURE — 63700000 HC SELF-ADMINISTRABLE DRUG: Performed by: HOSPITALIST

## 2021-04-17 PROCEDURE — 94761 N-INVAS EAR/PLS OXIMETRY MLT: CPT

## 2021-04-17 PROCEDURE — 80053 COMPREHEN METABOLIC PANEL: CPT | Performed by: INTERNAL MEDICINE

## 2021-04-17 PROCEDURE — 85025 COMPLETE CBC W/AUTO DIFF WBC: CPT | Performed by: INTERNAL MEDICINE

## 2021-04-17 PROCEDURE — 63600000 HC DRUGS/DETAIL CODE: Mod: JW | Performed by: HOSPITALIST

## 2021-04-17 PROCEDURE — 36415 COLL VENOUS BLD VENIPUNCTURE: CPT | Performed by: INTERNAL MEDICINE

## 2021-04-17 RX ORDER — BENZONATATE 100 MG/1
100 CAPSULE ORAL 3 TIMES DAILY PRN
Qty: 20 CAPSULE | Refills: 0 | Status: SHIPPED | OUTPATIENT
Start: 2021-04-17 | End: 2021-04-27

## 2021-04-17 RX ORDER — ASCORBIC ACID 500 MG
500 TABLET ORAL 2 TIMES DAILY WITH MEALS
Qty: 60 TABLET | Refills: 0 | Status: SHIPPED | OUTPATIENT
Start: 2021-04-17 | End: 2021-05-17

## 2021-04-17 RX ORDER — METFORMIN HYDROCHLORIDE 500 MG/1
500 TABLET, EXTENDED RELEASE ORAL 2 TIMES DAILY WITH MEALS
Qty: 60 TABLET | Refills: 0 | Status: SHIPPED | OUTPATIENT
Start: 2021-04-17 | End: 2021-05-17

## 2021-04-17 RX ORDER — BENZONATATE 100 MG/1
100 CAPSULE ORAL EVERY 4 HOURS PRN
Qty: 20 CAPSULE | Refills: 0 | Status: SHIPPED | OUTPATIENT
Start: 2021-04-17 | End: 2021-04-17

## 2021-04-17 RX ORDER — CHOLECALCIFEROL (VITAMIN D3) 25 MCG
2000 TABLET ORAL
Qty: 60 TABLET | Refills: 0 | Status: SHIPPED | OUTPATIENT
Start: 2021-04-17 | End: 2021-05-17

## 2021-04-17 RX ORDER — GUAIFENESIN 600 MG/1
600 TABLET, EXTENDED RELEASE ORAL 2 TIMES DAILY
Qty: 20 TABLET | Refills: 0 | Status: SHIPPED | OUTPATIENT
Start: 2021-04-17 | End: 2021-04-27

## 2021-04-17 RX ADMIN — DEXAMETHASONE SODIUM PHOSPHATE 6 MG: 4 INJECTION, SOLUTION INTRA-ARTICULAR; INTRALESIONAL; INTRAMUSCULAR; INTRAVENOUS; SOFT TISSUE at 08:55

## 2021-04-17 RX ADMIN — INSULIN ASPART 8 UNITS: 100 INJECTION, SOLUTION INTRAVENOUS; SUBCUTANEOUS at 12:22

## 2021-04-17 RX ADMIN — ACETAMINOPHEN 650 MG: 325 TABLET ORAL at 12:21

## 2021-04-17 RX ADMIN — INSULIN ASPART 6 UNITS: 100 INJECTION, SOLUTION INTRAVENOUS; SUBCUTANEOUS at 12:22

## 2021-04-17 RX ADMIN — MOMETASONE FUROATE AND FORMOTEROL FUMARATE DIHYDRATE 2 PUFF: 200; 5 AEROSOL RESPIRATORY (INHALATION) at 06:56

## 2021-04-17 RX ADMIN — INSULIN ASPART 8 UNITS: 100 INJECTION, SOLUTION INTRAVENOUS; SUBCUTANEOUS at 09:20

## 2021-04-17 RX ADMIN — PREGABALIN 150 MG: 75 CAPSULE ORAL at 08:55

## 2021-04-17 RX ADMIN — INSULIN ASPART 2 UNITS: 100 INJECTION, SOLUTION INTRAVENOUS; SUBCUTANEOUS at 08:58

## 2021-04-17 RX ADMIN — CITALOPRAM HYDROBROMIDE 5 MG: 10 TABLET ORAL at 08:55

## 2021-04-17 RX ADMIN — GUAIFENESIN 600 MG: 600 TABLET, EXTENDED RELEASE ORAL at 08:54

## 2021-04-17 RX ADMIN — INSULIN GLARGINE 18 UNITS: 100 INJECTION, SOLUTION SUBCUTANEOUS at 09:21

## 2021-04-17 RX ADMIN — OXYCODONE HYDROCHLORIDE AND ACETAMINOPHEN 500 MG: 500 TABLET ORAL at 08:55

## 2021-04-17 RX ADMIN — ENOXAPARIN SODIUM 50 MG: 60 INJECTION SUBCUTANEOUS at 06:34

## 2021-04-17 NOTE — DISCHARGE SUMMARY
[facility] Medicine Service -  Inpatient Discharge Summary        BRIEF OVERVIEW   Admitting Provider: Robby WILCOX MD  Attending Provider: Delia Costello MD Attending phys phone: [phone]    PCP: Jorje Grande [phone]    Admission Date: 4/9/2021  Discharge Date: 4/17/2021     DISCHARGE DIAGNOSES      Primary Discharge Diagnosis  Pneumonia due to COVID-19 virus    Secondary Discharge Diagnoses  Active Hospital Problems    Diagnosis Date Noted   • Pneumonia due to COVID-19 virus 04/09/2021     Priority: High   • Acute respiratory failure with hypoxia (CMS/HCC) 04/10/2021     Priority: Medium   • Sepsis (CMS/[facility]) 04/10/2021     Priority: Medium   • Type 2 diabetes mellitus, without long-term current use of insulin (CMS/[facility]) 04/09/2021     Priority: Medium   • Fibromyalgia 04/09/2021     Priority: Medium      Resolved Hospital Problems   No resolved problems to display.       Problem List on Day of Discharge  * Pneumonia due to COVID-19 virus  Assessment & Plan  With associated hypoxia, the patient saturation is 95% on 2L/min supplemental oxygen.  Found to be COVID-19 positive on 4/9 , her  already has it.    IV Decadron, prn Albuterol.  Pulmonary and ID are following     Further details in acute respiratory failure with hypoxia section.    Sepsis (CMS/[facility])  Assessment & Plan  Patient with sepsis, secondary to COVID-19 pneumonia.  Draw blood cultures.  Follow up on blood cultures.  ID following.    Acute respiratory failure with hypoxia (CMS/[facility])  Assessment & Plan  Acute respiratory failure with hypoxia, currently requiring 4 L of oxygen via nasal cannula to keep oxygen levels above 88.    This is secondary to COVID-19 pneumonia.  Continue pulmonary toileting, incentive spirometry, frequent position shifts.     Remdesivir initiated D # 5/5  D # 8/10  of IV dexamethasone   Continue 0.5mg/kg of Lovenox given vascular complications of Covid.  Started on Dulera for intractable cough    Increasing hypoxia today needing nonheated high flow at 13-15 liter with oxygen saturation 93-94 %   Continue to titrate oxygen to keep sat >92 %   Decadron 6 mg IV, 10-day course.or once hypoxia resolves    Started to wean off from oxygen on 4/14 - monitor   Home oxygen eval prior to discharge .      Fibromyalgia  Assessment & Plan  Continue Lyrica.    Type 2 diabetes mellitus, without long-term current use of insulin (CMS/[facility])  Assessment & Plan      HbA1c of 8.5  Uncontrolled BG while on IV steroids   Insulin adjusted again on 4/14  Holding oral med for now       SUMMARY OF HOSPITALIZATION      Presenting Problem/History of Present Illness  Hypoxia [R09.02]  Pneumonia due to COVID-19 virus [U07.1, J12.82]    This is a 60 y.o. year-old female admitted on 4/9/2021 with Hypoxia [R09.02]  Pneumonia due to COVID-19 virus [U07.1, J12.82].        Hospital Course    Ms. Stiles is 60 year old female was admitted here with Covid Pneumonia with acute respiratory failure - s/p Remdisivir and IV decadron - home oxygen eval on the day of discharge she passed oxygen and discharged home with out oxygen , decadron stopped at the time of discharge . Her vital signs and physical exam was acceptable for discharge home today with f/u with PCP and pulmonary OP .    Exam on Day of Discharge    Physical Exam   HEENT : Normocephalic / vision intact .  LUNGS : Clear BS / equal air entry in both lungs .  HEART : Regular / no Murmur   ABDOMEN : Soft / non tender / audible BS   EXTREMITY :  No edema   MUSCULOSKELETAL : No deformity   CNS : AAO X 3 , moving all limbs equally   BEHAVIORAL : Co-operative   SKIN .: No erythema / rash / ulcer     Consults During Admission  IP CONSULT TO PULMONOLOGY/SLEEP MEDICINE  IP CONSULT TO INFECTIOUS DISEASE    DISCHARGE MEDICATIONS        Medication List      START taking these medications    ascorbic acid 500 mg tablet  Commonly known as: VITAMIN C  Take 1 tablet (500 mg total) by mouth 2 (two) times a  day with meals.  Dose: 500 mg     benzonatate 100 mg capsule  Commonly known as: TESSALON  Take 1 capsule (100 mg total) by mouth every 4 (four) hours as needed for cough for up to 10 days.  Dose: 100 mg     cholecalciferol (vitamin D3) 1,000 unit (25 mcg) tablet  Take 2 tablets (2,000 Units total) by mouth daily with dinner.  Dose: 2,000 Units     guaiFENesin 600 mg 12 hr tablet  Commonly known as: MUCINEX  Take 1 tablet (600 mg total) by mouth 2 (two) times a day for 10 days.  Dose: 600 mg     sodium chloride 0.65 % nasal spray  Commonly known as: OCEAN  Administer 2 sprays into each nostril 2 (two) times a day as needed for congestion.  Dose: 2 spray        CHANGE how you take these medications    metFORMIN  mg 24 hr tablet  Commonly known as: GLUCOPHAGE-XR  Take 1 tablet (500 mg total) by mouth 2 (two) times a day with meals.  Dose: 500 mg  What changed: See the new instructions.        CONTINUE taking these medications    atorvastatin 20 mg tablet  Commonly known as: LIPITOR  Take 20 mg by mouth nightly.  Dose: 20 mg     citalopram 10 mg tablet  Commonly known as: celeXA  Take 5 mg by mouth once daily.  Dose: 5 mg     glimepiride 2 mg tablet  Commonly known as: AMARYL  Take 2 mg by mouth every morning.  Dose: 2 mg     LYRICA 150 mg capsule  TAKE 1 CAPSULE BY MOUTH TWO TIMES DAILY  Generic drug: pregabalin        STOP taking these medications    meloxicam 15 mg tablet  Commonly known as: MOBIC                   PROCEDURES / LABS / IMAGING            No results found for: COVID19    Pertinent Imaging  X-RAY CHEST 1 VIEW    Result Date: 4/14/2021  IMPRESSION: Diffuse interstitial infiltrates with worsening.    X-RAY CHEST 1 VIEW    Result Date: 4/9/2021  IMPRESSION: Multilobar pneumonia, consistent with the provided history of Covid 19.     Ultrasound venous leg bilateral    Result Date: 4/14/2021  IMPRESSION: No evidence of bilateral lower extremity femoropopliteal deep venous thrombosis.      OUTPATIENT   FOLLOW-UP / REFERRALS / PENDING TESTS        Outpatient Follow-Up Appointments            In 5 days Analy Soto MD [facility] in [facility]          Referrals  No orders of the defined types were placed in this encounter.      Test Results Pending at Discharge  Unresulted Labs (From admission, onward) Comment     Start     Ordered    04/11/21 0600  CBC and Differential  Daily     Question:  Release to patient  Answer:  Immediate   Start Status   04/18/21 0600 Scheduled   04/19/21 0600 Scheduled   04/20/21 0600 Scheduled       04/10/21 1325    04/11/21 0600  Comprehensive metabolic panel  Daily     Question:  Release to patient  Answer:  Immediate   Start Status   04/18/21 0600 Scheduled   04/19/21 0600 Scheduled   04/20/21 0600 Scheduled       04/10/21 1325                Important Issues to Address in Follow-Up  F/u with PCP in 2-3 weeks / repeat chest imaging in 2-3 weeks - f/u with pulmonary     DISCHARGE DISPOSITION      Disposition: Home     Code Status At Discharge: Full Code    Physician Order for Life-Sustaining Treatment Document Status      No documents found

## 2021-04-17 NOTE — NURSING NOTE
Home Oxygen Qualification Protocol    Findings:  Room Air Spo2 at rest: 92 %  Room Air SpO2 % with ambulation: 89 %       Conclusion:  Meets criteria for home oxygen: No  Does not meet criteria for home oxygen: Yes    Oxygen Recommendation:             The information above was collected from the most recent home oxygen qualification protocol performed on the patient.

## 2021-04-17 NOTE — PLAN OF CARE
Problem: Adult Inpatient Plan of Care  Goal: Plan of Care Review  4/17/2021 1313 by Bella Moulton RN  Outcome: Met  4/17/2021 1137 by Bella Moulton RN  Outcome: Progressing     Problem: Adult Inpatient Plan of Care  Goal: Patient-Specific Goal (Individualization)  4/17/2021 1313 by Bella Moulton RN  Outcome: Met  4/17/2021 1137 by Bella Moulton RN  Outcome: Progressing

## 2021-04-17 NOTE — PLAN OF CARE
Problem: Adult Inpatient Plan of Care  Goal: Absence of Hospital-Acquired Illness or Injury  Outcome: Progressing     Problem: Adult Inpatient Plan of Care  Goal: Patient-Specific Goal (Individualization)  Outcome: Progressing

## 2021-04-17 NOTE — NURSING NOTE
Patient discharge to home, all d/c instructions given to patient. Patient verbalized understanding of the instructions, awaiting for  will wheel patient down when  arrives, no apparent distress noted at this time.

## 2021-04-22 ENCOUNTER — OFFICE VISIT (OUTPATIENT)
Dept: OBSTETRICS AND GYNECOLOGY | Facility: CLINIC | Age: 61
End: 2021-04-22
Payer: COMMERCIAL

## 2021-04-22 ENCOUNTER — HOSPITAL ENCOUNTER (OUTPATIENT)
Dept: RADIOLOGY | Age: 61
Discharge: HOME | End: 2021-04-22
Attending: OBSTETRICS & GYNECOLOGY
Payer: COMMERCIAL

## 2021-04-22 VITALS
WEIGHT: 218 LBS | SYSTOLIC BLOOD PRESSURE: 110 MMHG | HEIGHT: 65 IN | DIASTOLIC BLOOD PRESSURE: 60 MMHG | BODY MASS INDEX: 36.32 KG/M2

## 2021-04-22 DIAGNOSIS — Z01.419 ENCOUNTER FOR ANNUAL ROUTINE GYNECOLOGICAL EXAMINATION: Primary | ICD-10-CM

## 2021-04-22 DIAGNOSIS — Z12.31 ENCOUNTER FOR SCREENING MAMMOGRAM FOR BREAST CANCER: ICD-10-CM

## 2021-04-22 DIAGNOSIS — B97.7 HPV (HUMAN PAPILLOMA VIRUS) INFECTION: ICD-10-CM

## 2021-04-22 PROCEDURE — 77067 SCR MAMMO BI INCL CAD: CPT

## 2021-04-22 PROCEDURE — 77063 BREAST TOMOSYNTHESIS BI: CPT

## 2021-04-22 PROCEDURE — S0612 ANNUAL GYNECOLOGICAL EXAMINA: HCPCS | Performed by: OBSTETRICS & GYNECOLOGY

## 2021-04-22 ASSESSMENT — ENCOUNTER SYMPTOMS
CARDIOVASCULAR NEGATIVE: 1
PSYCHIATRIC NEGATIVE: 1
ENDOCRINE NEGATIVE: 1
CONSTITUTIONAL NEGATIVE: 1
RESPIRATORY NEGATIVE: 1
GASTROINTESTINAL NEGATIVE: 1
NEUROLOGICAL NEGATIVE: 1

## 2021-04-22 NOTE — PROGRESS NOTES
"2021  Maylin Stiles is a 60 y.o. female who presents for annual exam.   Periods are absent; denies bleeding, hot flashes have improved, some night sweats.  Patient was just recently released from [facility] after 8-day stay due to Covid, was not intubated.    The patient is sexually active.  Complains of vaginal dryness, discomfort with intercourse, though coitus infrequent.  GYN screening history: last pap: was abnormal: Positive HPV. Domestic violence: no.     Current contraception: post menopausal status  History of abnormal Pap smear: yes - Multiple, positive HPV, ASCUS  Family history of uterine or ovarian cancer: no  Regular self breast exam: yes  History of abnormal mammogram: no  Family history of breast cancer: no  History of abnormal lipids: yes - Medication  Menstrual History:  OB History        2    Para        Term                AB   1    Living   1       SAB   1    TAB        Ectopic        Multiple        Live Births                    No LMP recorded (lmp unknown). Patient is postmenopausal.         Review of Systems and Physical Exam  The following have been reviewed and updated as appropriate in this visit:      Visit Vitals  /60   Ht 1.651 m (5' 5\")   Wt 98.9 kg (218 lb)   LMP  (LMP Unknown)   BMI 36.28 kg/m²     HPI  Review of Systems   Constitutional: Negative.    HENT: Negative.    Respiratory: Negative.    Cardiovascular: Negative.    Gastrointestinal: Negative.    Endocrine: Negative.    Genitourinary: Negative.    Breast: Negative.   Neurological: Negative.    Psychiatric/Behavioral: Negative.      Physical Exam  Constitutional:       Appearance: Normal appearance. She is well-developed.   Genitourinary:      Pelvic exam was performed with patient in the lithotomy position.      Urethra, cervix and uterus normal.      No labial rash.      There is no injury on the right labia.      There is no injury on the left labia.      No signs of injury or lesions in " the vagina.      Vaginal atrophy present.      No vaginal discharge, erythema or tenderness.      Uterus is regular.      Right and left adnexa are non-palpable.   Pelvic exam was performed with patient in lithotomy exam position.   No signs of erythema. There is no injury on the right external genitalia. There is no injury on the left external genitalia.   Urethral meatus normal.   Urethra normal.   Normal bladder. There is vaginal atrophy.There is no uterine prolapse, rectocele or enterocele present. Perineum intact  Cervix exam normal.  Uterus is normal. Uterine contour is regular. HENT:      Head: Normocephalic.   Eyes:      General: Lids are normal.      Conjunctiva/sclera: Conjunctivae normal.   Cardiovascular:      Rate and Rhythm: Normal rate and regular rhythm.   Chest:      Breasts:         Right: No mass, nipple discharge or skin change.         Left: No mass, nipple discharge or skin change.   Abdominal:      General: Bowel sounds are normal.      Palpations: Abdomen is soft.   Musculoskeletal:         General: Normal range of motion.   Neurological:      Mental Status: She is alert.   Psychiatric:         Speech: Speech normal.         Behavior: Behavior normal. Behavior is cooperative.         Problem List Items Addressed This Visit        Infectious/Inflammatory    HPV (human papilloma virus) infection     Check Pap         Relevant Orders    Cytology, Thinprep Pap       Other    Encounter for annual routine gynecological examination - Primary     Calcium, vitamin D, exercise  Patient is up-to-date with colon screening         Relevant Orders    Cytology, Thinprep Pap    Encounter for screening mammogram for breast cancer     Rx given         Relevant Orders    BI SCREENING MAMMOGRAM BILATERAL(TOMOSYNTHESIS)      .  All questions answered.  Await pap smear results.  Breast self exam technique reviewed and patient encouraged to perform self-exam monthly.  Diagnosis explained in detail, including  differential.  Discussed healthy lifestyle modifications.  Follow up as needed.  Mammogram.  Thin prep Pap smear..  No follow-ups on file.  Analy Soto MD

## 2021-04-25 LAB
CYTOLOGIST CVX/VAG CYTO: ABNORMAL
CYTOLOGY CVX/VAG DOC CYTO: ABNORMAL
CYTOLOGY CVX/VAG DOC THIN PREP: ABNORMAL
DX ICD CODE: ABNORMAL
HPV I/H RISK 4 DNA CVX QL PROBE+SIG AMP: POSITIVE
HPV16 DNA CVX QL PROBE+SIG AMP: NEGATIVE
HPV18+45 E6+E7 MRNA CVX QL NAA+PROBE: NEGATIVE
LAB CORP NOTE:: ABNORMAL
OTHER STN SPEC: ABNORMAL
STAT OF ADQ CVX/VAG CYTO-IMP: ABNORMAL

## 2021-05-26 ENCOUNTER — TELEPHONE (OUTPATIENT)
Dept: OBSTETRICS AND GYNECOLOGY | Facility: CLINIC | Age: 61
End: 2021-05-26

## 2021-05-26 NOTE — TELEPHONE ENCOUNTER
Left message on patient's personal voicemail.  Pap shows positive HPV again; this is a longstanding issue.  Needs colposcopy or discuss possibility of LEEP procedure in attempt to treat this persistent HPV.  Patient to call back or schedule colpo

## 2021-09-14 ENCOUNTER — HOSPITAL ENCOUNTER (EMERGENCY)
Facility: HOSPITAL | Age: 61
Discharge: HOME | End: 2021-09-14
Attending: EMERGENCY MEDICINE
Payer: COMMERCIAL

## 2021-09-14 ENCOUNTER — APPOINTMENT (EMERGENCY)
Dept: RADIOLOGY | Facility: HOSPITAL | Age: 61
End: 2021-09-14
Attending: EMERGENCY MEDICINE
Payer: COMMERCIAL

## 2021-09-14 VITALS
HEART RATE: 91 BPM | DIASTOLIC BLOOD PRESSURE: 66 MMHG | TEMPERATURE: 98.4 F | SYSTOLIC BLOOD PRESSURE: 113 MMHG | RESPIRATION RATE: 16 BRPM | OXYGEN SATURATION: 93 %

## 2021-09-14 DIAGNOSIS — T78.40XA ALLERGIC REACTION, INITIAL ENCOUNTER: Primary | ICD-10-CM

## 2021-09-14 LAB
ANION GAP SERPL CALC-SCNC: 15 MEQ/L (ref 3–15)
ATRIAL RATE: 88
BASOPHILS # BLD: 0.01 K/UL (ref 0.01–0.1)
BASOPHILS NFR BLD: 0.1 %
BUN SERPL-MCNC: 19 MG/DL (ref 8–20)
CALCIUM SERPL-MCNC: 8.8 MG/DL (ref 8.9–10.3)
CHLORIDE SERPL-SCNC: 107 MEQ/L (ref 98–109)
CO2 SERPL-SCNC: 19 MEQ/L (ref 22–32)
CREAT SERPL-MCNC: 1 MG/DL (ref 0.6–1.1)
DIFFERENTIAL METHOD BLD: ABNORMAL
EOSINOPHIL # BLD: 0.12 K/UL (ref 0.04–0.36)
EOSINOPHIL NFR BLD: 1.3 %
ERYTHROCYTE [DISTWIDTH] IN BLOOD BY AUTOMATED COUNT: 12.5 % (ref 11.7–14.4)
GFR SERPL CREATININE-BSD FRML MDRD: 56.4 ML/MIN/1.73M*2
GLUCOSE SERPL-MCNC: 220 MG/DL (ref 70–99)
HCT VFR BLDCO AUTO: 39.6 % (ref 35–45)
HGB BLD-MCNC: 13.4 G/DL (ref 11.8–15.7)
IMM GRANULOCYTES # BLD AUTO: 0.03 K/UL (ref 0–0.08)
IMM GRANULOCYTES NFR BLD AUTO: 0.3 %
LYMPHOCYTES # BLD: 4.19 K/UL (ref 1.2–3.5)
LYMPHOCYTES NFR BLD: 46.7 %
MCH RBC QN AUTO: 31.8 PG (ref 28–33.2)
MCHC RBC AUTO-ENTMCNC: 33.8 G/DL (ref 32.2–35.5)
MCV RBC AUTO: 93.8 FL (ref 83–98)
MONOCYTES # BLD: 0.47 K/UL (ref 0.28–0.8)
MONOCYTES NFR BLD: 5.2 %
NEUTROPHILS # BLD: 4.16 K/UL (ref 1.7–7)
NEUTS SEG NFR BLD: 46.4 %
NRBC BLD-RTO: 0 %
P AXIS: 43
PDW BLD AUTO: 10.6 FL (ref 9.4–12.3)
PLATELET # BLD AUTO: 208 K/UL (ref 150–369)
POTASSIUM SERPL-SCNC: 3.4 MEQ/L (ref 3.6–5.1)
PR INTERVAL: 148
QRS DURATION: 82
QT INTERVAL: 428
QTC CALCULATION(BAZETT): 517
R AXIS: 10
RBC # BLD AUTO: 4.22 M/UL (ref 3.93–5.22)
SODIUM SERPL-SCNC: 141 MEQ/L (ref 136–144)
T WAVE AXIS: 41
TROPONIN I SERPL-MCNC: <0.02 NG/ML
VENTRICULAR RATE: 88
WBC # BLD AUTO: 8.98 K/UL (ref 3.8–10.5)

## 2021-09-14 PROCEDURE — 80048 BASIC METABOLIC PNL TOTAL CA: CPT | Performed by: EMERGENCY MEDICINE

## 2021-09-14 PROCEDURE — 93010 ELECTROCARDIOGRAM REPORT: CPT | Performed by: INTERNAL MEDICINE

## 2021-09-14 PROCEDURE — 71045 X-RAY EXAM CHEST 1 VIEW: CPT

## 2021-09-14 PROCEDURE — 96374 THER/PROPH/DIAG INJ IV PUSH: CPT

## 2021-09-14 PROCEDURE — 85025 COMPLETE CBC W/AUTO DIFF WBC: CPT | Performed by: EMERGENCY MEDICINE

## 2021-09-14 PROCEDURE — 84484 ASSAY OF TROPONIN QUANT: CPT | Performed by: EMERGENCY MEDICINE

## 2021-09-14 PROCEDURE — 3E033GC INTRODUCTION OF OTHER THERAPEUTIC SUBSTANCE INTO PERIPHERAL VEIN, PERCUTANEOUS APPROACH: ICD-10-PCS | Performed by: EMERGENCY MEDICINE

## 2021-09-14 PROCEDURE — 25000000 HC PHARMACY GENERAL: Performed by: EMERGENCY MEDICINE

## 2021-09-14 PROCEDURE — 36415 COLL VENOUS BLD VENIPUNCTURE: CPT | Performed by: EMERGENCY MEDICINE

## 2021-09-14 PROCEDURE — 93005 ELECTROCARDIOGRAM TRACING: CPT | Performed by: EMERGENCY MEDICINE

## 2021-09-14 PROCEDURE — 99284 EMERGENCY DEPT VISIT MOD MDM: CPT | Mod: 25

## 2021-09-14 RX ORDER — FAMOTIDINE 10 MG/ML
20 INJECTION INTRAVENOUS ONCE
Status: COMPLETED | OUTPATIENT
Start: 2021-09-14 | End: 2021-09-14

## 2021-09-14 RX ORDER — EPINEPHRINE 0.3 MG/.3ML
1 INJECTION SUBCUTANEOUS AS NEEDED
Qty: 1 EACH | Refills: 0 | Status: SHIPPED | OUTPATIENT
Start: 2021-09-14

## 2021-09-14 RX ADMIN — FAMOTIDINE 20 MG: 10 INJECTION INTRAVENOUS at 09:26

## 2021-09-14 ASSESSMENT — ENCOUNTER SYMPTOMS
FEVER: 0
PALPITATIONS: 0
WHEEZING: 0
SEIZURES: 0
NAUSEA: 0
VOICE CHANGE: 0
DIZZINESS: 0
TROUBLE SWALLOWING: 1
BACK PAIN: 0
NECK PAIN: 0
ABDOMINAL PAIN: 0
FACIAL SWELLING: 0
NECK STIFFNESS: 0
ALLERGIC REACTION: 1
VOMITING: 0
COUGH: 0
PHOTOPHOBIA: 0
HEADACHES: 0
NUMBNESS: 0
FACIAL ASYMMETRY: 0
CHILLS: 0
LIGHT-HEADEDNESS: 1
WEAKNESS: 0
STRIDOR: 0
FLANK PAIN: 0
DIFFICULTY BREATHING: 1
DYSURIA: 0
FATIGUE: 0
DIAPHORESIS: 1

## 2021-09-14 NOTE — ED PROVIDER NOTES
Emergency Medicine Note  HPI   HISTORY OF PRESENT ILLNESS       History provided by:  Patient and EMS personnel  Allergic Reaction  Presenting symptoms: difficulty breathing, difficulty swallowing, itching, rash and swelling    Presenting symptoms: no wheezing    Severity:  Moderate  Duration:  90 minutes  Prior allergic episodes:  No prior episodes  Context: medications    Context comment:  Amoxicillin for dental procedure 90 minutes prior to onset of symptoms  Relieved by:  None tried  Worsened by:  Nothing        Patient History   PAST HISTORY     Reviewed from Nursing Triage:      Past Medical History:   Diagnosis Date   • Depression with anxiety    • Diabetes mellitus (CMS/HCC)    • Fibromyalgia        Past Surgical History:   Procedure Laterality Date   • OVARIAN CYST REMOVAL         Family History   Problem Relation Age of Onset   • Diabetes Biological Father    • Breast cancer Biological Mother    • Diabetes Biological Mother        Social History     Tobacco Use   • Smoking status: Former Smoker     Packs/day: 1.00     Years: 10.00     Pack years: 10.00     Types: Cigarettes     Start date:      Quit date:      Years since quittin.7   • Smokeless tobacco: Former User   Substance Use Topics   • Alcohol use: Not Currently   • Drug use: No         Review of Systems   REVIEW OF SYSTEMS     Review of Systems   Constitutional: Positive for diaphoresis. Negative for chills, fatigue and fever.   HENT: Positive for trouble swallowing. Negative for facial swelling and voice change.    Eyes: Negative for photophobia and visual disturbance.   Respiratory: Negative for cough, wheezing and stridor.    Cardiovascular: Negative for chest pain, palpitations and leg swelling.   Gastrointestinal: Negative for abdominal pain, nausea and vomiting.   Genitourinary: Negative for dysuria and flank pain.   Musculoskeletal: Negative for back pain, neck pain and neck stiffness.   Skin: Positive for itching and rash.  Negative for pallor.   Neurological: Positive for light-headedness. Negative for dizziness, seizures, facial asymmetry, weakness, numbness and headaches.         VITALS     ED Vitals    Date/Time Temp Pulse Resp BP SpO2 [facility]   09/14/21 1218 -- 91 16 113/66 93 % SD   09/14/21 1115 -- 98 20 118/68 96 % SD   09/14/21 1114 36.9 °C (98.4 °F) -- -- -- -- SD   09/14/21 1018 -- 95 14 131/53 98 % ML   09/14/21 0919 -- 88 26 106/47 96 % ML   09/14/21 0918 -- 91 22 -- 93 % ML        Pulse Ox %: 96 % (09/14/21 0951)  Pulse Ox Interpretation: Normal (09/14/21 0951)  Heart Rate: 88 (09/14/21 0951)  Rhythm Strip Interpretation: Normal Sinus Rhythm (09/14/21 0951)     Physical Exam   PHYSICAL EXAM     Physical Exam  Vitals and nursing note reviewed.   Constitutional:       Appearance: Normal appearance.   HENT:      Head: Normocephalic.      Mouth/Throat:      Mouth: Mucous membranes are moist.      Pharynx: Oropharynx is clear. Uvula midline. No pharyngeal swelling or uvula swelling.   Eyes:      General: No scleral icterus.     Conjunctiva/sclera: Conjunctivae normal.      Pupils: Pupils are equal, round, and reactive to light.   Cardiovascular:      Rate and Rhythm: Normal rate and regular rhythm.      Pulses: Normal pulses.      Heart sounds: Normal heart sounds. No murmur heard.     Pulmonary:      Effort: Pulmonary effort is normal. No respiratory distress.      Breath sounds: Normal breath sounds. No stridor. No wheezing, rhonchi or rales.   Musculoskeletal:         General: No swelling or tenderness.      Cervical back: Neck supple.      Right lower leg: No edema.      Left lower leg: No edema.   Skin:     General: Skin is warm and dry.      Capillary Refill: Capillary refill takes less than 2 seconds.      Findings: Rash (Urticarial) present.   Neurological:      General: No focal deficit present.      Mental Status: She is alert and oriented to person, place, and time.      Cranial Nerves: No cranial nerve deficit.       Sensory: No sensory deficit.      Motor: No weakness.      Deep Tendon Reflexes: Reflexes normal.           PROCEDURES     Procedures     DATA     Results     Procedure Component Value Units Date/Time    Basic metabolic panel [[id number]]  (Abnormal) Collected: 09/14/21 0921    Specimen: Blood, Venous Updated: 09/14/21 1022     Sodium 141 mEQ/L      Potassium 3.4 mEQ/L      Comment: Results obtained on plasma. Plasma Potassium values may be up to 0.4 mEQ/L less than serum values. The differences may be greater for patients with high platelet or white cell counts.        Chloride 107 mEQ/L      CO2 19 mEQ/L      BUN 19 mg/dL      Creatinine 1.0 mg/dL      Glucose 220 mg/dL      Calcium 8.8 mg/dL      eGFR 56.4 mL/min/1.73m*2      Anion Gap 15 mEQ/L     Troponin I [[id number]]  (Normal) Collected: 09/14/21 0921    Specimen: Blood, Venous Updated: 09/14/21 1006     Troponin I <0.02 ng/mL     CBC and differential [[id number]]  (Abnormal) Collected: 09/14/21 0921    Specimen: Blood, Venous Updated: 09/14/21 0933     WBC 8.98 K/uL      RBC 4.22 M/uL      Hemoglobin 13.4 g/dL      Hematocrit 39.6 %      MCV 93.8 fL      MCH 31.8 pg      MCHC 33.8 g/dL      RDW 12.5 %      Platelets 208 K/uL      MPV 10.6 fL      Differential Type Auto     nRBC 0.0 %      Immature Granulocytes 0.3 %      Neutrophils 46.4 %      Lymphocytes 46.7 %      Monocytes 5.2 %      Eosinophils 1.3 %      Basophils 0.1 %      Immature Granulocytes, Absolute 0.03 K/uL      Neutrophils, Absolute 4.16 K/uL      Lymphocytes, Absolute 4.19 K/uL      Monocytes, Absolute 0.47 K/uL      Eosinophils, Absolute 0.12 K/uL      Basophils, Absolute 0.01 K/uL           Imaging Results          X-RAY CHEST 1 VIEW (Final result)  Result time 09/14/21 09:47:54    Final result                 Impression:    IMPRESSION: Radiographically normal chest.    COMMENT:    A single portable AP erect chest radiograph is compared to a prior  study dated 4/14/2021. The  cardiomediastinal contour remains normal. The lungs  remain clear bilaterally without pneumothorax. The osseous thorax and  surrounding soft tissues remain unremarkable.               Narrative:    CLINICAL HISTORY:    60-year-old female with allergic reaction.                                ECG 12 lead   ED Interpretation   Sinus rhythm 88Nonspecific ST-T wave abnormality      Final Result          Scoring tools                                 ED Course & MDM   MDM / ED COURSE and CLINICAL IMPRESSIONS     [facility]    ED Course as of Sep 14 1304   Tue Sep 14, 2021   1302 Patient feeling better.  Rash resolved.  Lungs clear to auscultation.  No oropharyngeal swelling.  Vitals stable.  Recommend follow-up PCP and allergist.  Recommend avoiding amoxicillin pending follow-up    [JK]      ED Course User Index  [JK] Watson Leslie MD         Clinical Impressions as of Sep 14 1304   Allergic reaction, initial encounter            Watson Leslie MD  09/14/21 1304

## 2022-01-19 ENCOUNTER — TRANSCRIBE ORDERS (OUTPATIENT)
Dept: SCHEDULING | Age: 62
End: 2022-01-19

## 2022-01-19 DIAGNOSIS — I65.23 OCCLUSION AND STENOSIS OF BILATERAL CAROTID ARTERIES: Primary | ICD-10-CM

## 2022-01-27 ENCOUNTER — HOSPITAL ENCOUNTER (OUTPATIENT)
Dept: CARDIOLOGY | Facility: HOSPITAL | Age: 62
Discharge: HOME | End: 2022-01-27
Attending: FAMILY MEDICINE
Payer: COMMERCIAL

## 2022-01-27 DIAGNOSIS — I65.23 OCCLUSION AND STENOSIS OF BILATERAL CAROTID ARTERIES: ICD-10-CM

## 2022-01-27 PROCEDURE — 93880 EXTRACRANIAL BILAT STUDY: CPT | Mod: 26 | Performed by: SURGERY

## 2022-01-27 PROCEDURE — 93880 EXTRACRANIAL BILAT STUDY: CPT

## 2022-01-28 LAB
LEFT CCA DIST DIAS: 36.99 CM/S
LEFT CCA DIST SYS: 101.6 CM/S
LEFT CCA MID DIAS: 30.53 CM/S
LEFT CCA MID SYS: 124.21 CM/S
LEFT CCA PROX DIAS: 35.37 CM/S
LEFT CCA PROX SYS: 98.37 CM/S
LEFT ICA DIST DIAS: 49.91 CM/S
LEFT ICA DIST SYS: 99.98 CM/S
LEFT ICA MID DIAS: 45.06 CM/S
LEFT ICA MID SYS: 88.68 CM/S
LEFT ICA PROX DIAS: 30.93 CM/S
LEFT ICA PROX SYS: 73.64 CM/S
LEFT ICA/CCA SYS: 0.98
LT BULB EDV: 24.07 CM/S
LT BULB PSV: 63.64 CM/S
LT ECA PROX EDV: 25.68 CM/S
LT ECA PROX PSV: 117.75 CM/S
LT VERTEBRAL MID EDV: 28.34 CM/S
LT VERTEBRAL MID PSV: 65.87 CM/S
RIGHT CCA DIST DIAS: 39.17 CM/S
RIGHT CCA DIST SYS: 125.78 CM/S
RIGHT CCA MID DIAS: 37.22 CM/S
RIGHT CCA MID SYS: 119.93 CM/S
RIGHT CCA PROX DIAS: 31.34 CM/S
RIGHT CCA PROX SYS: 120.04 CM/S
RIGHT ECA SYS: 134.52 CM/S
RIGHT ICA DIST DIAS: 40.22 CM/S
RIGHT ICA DIST SYS: 93.52 CM/S
RIGHT ICA MID DIAS: 23.55 CM/S
RIGHT ICA MID SYS: 49.18 CM/S
RIGHT ICA PROX DIAS: 36.94 CM/S
RIGHT ICA PROX SYS: 115.93 CM/S
RIGHT ICA/CCA SYS: 0.92
RT BULB EDV: 11.98 CM/S
RT BULB PSV: 69.14 CM/S
RT ECA PROC EDV: 25.32 CM/S
RT VERTEBRAL MID EDV: 12.13 CM/S
RT VERTEBRAL MID PSV: 44.41 CM/S

## 2022-05-09 ENCOUNTER — TRANSCRIBE ORDERS (OUTPATIENT)
Dept: RADIOLOGY | Facility: HOSPITAL | Age: 62
End: 2022-05-09

## 2022-05-09 ENCOUNTER — HOSPITAL ENCOUNTER (OUTPATIENT)
Dept: RADIOLOGY | Facility: HOSPITAL | Age: 62
Discharge: HOME | End: 2022-05-09
Attending: FAMILY MEDICINE
Payer: COMMERCIAL

## 2022-05-09 DIAGNOSIS — Z12.39 ENCOUNTER FOR OTHER SCREENING FOR MALIGNANT NEOPLASM OF BREAST: ICD-10-CM

## 2022-05-09 DIAGNOSIS — Z12.39 ENCOUNTER FOR OTHER SCREENING FOR MALIGNANT NEOPLASM OF BREAST: Primary | ICD-10-CM

## 2022-05-09 PROCEDURE — 77067 SCR MAMMO BI INCL CAD: CPT

## 2023-05-31 ENCOUNTER — TRANSCRIBE ORDERS (OUTPATIENT)
Dept: RADIOLOGY | Age: 63
End: 2023-05-31

## 2023-05-31 ENCOUNTER — HOSPITAL ENCOUNTER (OUTPATIENT)
Dept: RADIOLOGY | Age: 63
Discharge: HOME | End: 2023-05-31
Attending: FAMILY MEDICINE
Payer: COMMERCIAL

## 2023-05-31 DIAGNOSIS — Z12.31 ENCOUNTER FOR SCREENING MAMMOGRAM FOR MALIGNANT NEOPLASM OF BREAST: Primary | ICD-10-CM

## 2023-05-31 DIAGNOSIS — Z12.31 ENCOUNTER FOR SCREENING MAMMOGRAM FOR MALIGNANT NEOPLASM OF BREAST: ICD-10-CM

## 2023-05-31 PROCEDURE — 77063 BREAST TOMOSYNTHESIS BI: CPT

## 2023-05-31 PROCEDURE — 77067 SCR MAMMO BI INCL CAD: CPT

## 2023-09-25 ENCOUNTER — HOSPITAL ENCOUNTER (OUTPATIENT)
Facility: CLINIC | Age: 63
Discharge: HOME | End: 2023-09-25
Attending: INTERNAL MEDICINE
Payer: COMMERCIAL

## 2023-09-25 ENCOUNTER — APPOINTMENT (OUTPATIENT)
Dept: RADIOLOGY | Age: 63
End: 2023-09-25
Attending: INTERNAL MEDICINE
Payer: COMMERCIAL

## 2023-09-25 VITALS
OXYGEN SATURATION: 97 % | DIASTOLIC BLOOD PRESSURE: 84 MMHG | RESPIRATION RATE: 18 BRPM | HEART RATE: 82 BPM | TEMPERATURE: 98.1 F | SYSTOLIC BLOOD PRESSURE: 132 MMHG

## 2023-09-25 DIAGNOSIS — S96.911A STRAIN OF RIGHT FOOT, INITIAL ENCOUNTER: Primary | ICD-10-CM

## 2023-09-25 PROCEDURE — S9083 URGENT CARE CENTER GLOBAL: HCPCS | Performed by: INTERNAL MEDICINE

## 2023-09-25 PROCEDURE — 73630 X-RAY EXAM OF FOOT: CPT | Mod: RT | Performed by: INTERNAL MEDICINE

## 2023-09-25 PROCEDURE — 99213 OFFICE O/P EST LOW 20 MIN: CPT | Performed by: INTERNAL MEDICINE

## 2023-09-25 RX ORDER — GLIMEPIRIDE 4 MG/1
4 TABLET ORAL DAILY
COMMUNITY
Start: 2023-09-11

## 2023-09-25 RX ORDER — SEMAGLUTIDE 0.68 MG/ML
INJECTION, SOLUTION SUBCUTANEOUS
COMMUNITY
Start: 2023-09-24

## 2023-09-25 RX ORDER — MELOXICAM 7.5 MG/1
TABLET ORAL
COMMUNITY
Start: 2023-09-06

## 2023-09-25 ASSESSMENT — ENCOUNTER SYMPTOMS
FEVER: 0
NAUSEA: 0
COLOR CHANGE: 1
EXTREMITY NUMBNESS: 0
SHORTNESS OF BREATH: 0
VOMITING: 0

## 2023-09-25 NOTE — ED PROVIDER NOTES
"History  Chief Complaint   Patient presents with    Foot Injury     RT foot pain / No known injury x > 3 weeks  Outside of foot along foot       History provided by:  Patient   used: No    Foot Injury  Location:  Foot  Foot location:  R foot (lateral foot, stiff great toe)  Pain details:     Quality: \"Bones pressing into the ground\"    Onset quality:  Sudden    Duration: 3-4 weeks.  Relieved by:  Acetaminophen  Exacerbated by: walking a lot recently in [place] in new orthotic shoes she was wearing.  Associated symptoms: swelling    Associated symptoms: no fever, no numbness and no tingling    Risk factors: no concern for non-accidental trauma and no known bone disorder        Past Medical History:   Diagnosis Date    Depression with anxiety     Diabetes mellitus (CMS/HCC)     Fibromyalgia        Past Surgical History:   Procedure Laterality Date    OVARIAN CYST REMOVAL         Family History   Problem Relation Age of Onset    Diabetes Biological Father     Breast cancer Biological Mother     Diabetes Biological Mother        Social History     Tobacco Use    Smoking status: Former     Packs/day: 1.00     Years: 10.00     Additional pack years: 0.00     Total pack years: 10.00     Types: Cigarettes     Start date:      Quit date:      Years since quittin.7    Smokeless tobacco: Former   Substance Use Topics    Alcohol use: Not Currently    Drug use: No       Review of Systems   Constitutional: Negative for fever.   Respiratory: Negative for shortness of breath.    Cardiovascular: Negative for chest pain.   Gastrointestinal: Negative for nausea and vomiting.   Skin: Positive for color change.       Physical Exam  ED Triage Vitals [23 1159]   Temp Heart Rate Resp BP SpO2   36.7 °C (98.1 °F) 82 18 132/84 97 %      Temp src Heart Rate Source Patient Position BP Location FiO2 (%) (Set)   -- -- -- -- --       Physical Exam  Vitals reviewed.   Constitutional:       General: She " is not in acute distress.     Appearance: She is not toxic-appearing.   Cardiovascular:      Rate and Rhythm: Normal rate and regular rhythm.      Heart sounds: No murmur heard.     No friction rub.   Pulmonary:      Effort: Pulmonary effort is normal. No respiratory distress.      Breath sounds: No wheezing or rales.   Musculoskeletal:         General: Tenderness present. No swelling or signs of injury.      Comments: TTP right lateral foot   Skin:     General: Skin is warm and dry.      Findings: No bruising or rash.   Neurological:      Mental Status: She is alert and oriented to person, place, and time.      Gait: Gait abnormal.   Psychiatric:         Mood and Affect: Mood normal.         Behavior: Behavior normal.           Procedures  Procedures    UC Course       Medical Decision Making  Pain seems to be more on the lateral oriented.  There was a potential Lisfranc's injury noted on the x-ray.  Going to place her in a boot and have her follow-up with the specialist at [facility] with whom I communicated.                   Pernell Thornton,   09/25/23 1300

## 2023-09-25 NOTE — DISCHARGE INSTRUCTIONS
Dr. Cecile Carlisle    X-ray showed potential abnormalities but this is away from the outer aspect of the foot where most of the pain seems to be centered.  That said, follow-up with the specialist at [facility] is strongly recommended.  I have attached the [facility] patient scheduling card.  Please call today and let the office know you are seen in the [facility] urgent care and [facility] and that the urgent care physician communicated directly with the specialist about the need for prompt follow-up.    I want you to stay off your foot is much as possible.  When out and about please use the provided boot.  You cannot drive in the boot.  You do not have to sleep in the boot.  I would use ice over the course of the day and 20-minute intervals.  I have attached the official x-ray report and the [facility] patient scheduling information

## 2023-10-02 ENCOUNTER — TRANSCRIBE ORDERS (OUTPATIENT)
Dept: SCHEDULING | Age: 63
End: 2023-10-02

## 2023-10-02 DIAGNOSIS — M84.374A STRESS FRACTURE, RIGHT FOOT, INITIAL ENCOUNTER FOR FRACTURE: Primary | ICD-10-CM

## 2023-10-07 ENCOUNTER — HOSPITAL ENCOUNTER (OUTPATIENT)
Dept: RADIOLOGY | Facility: HOSPITAL | Age: 63
Discharge: HOME | End: 2023-10-07
Attending: PODIATRIST
Payer: COMMERCIAL

## 2023-10-07 DIAGNOSIS — M84.374A STRESS FRACTURE, RIGHT FOOT, INITIAL ENCOUNTER FOR FRACTURE: ICD-10-CM

## 2023-10-07 PROCEDURE — 73718 MRI LOWER EXTREMITY W/O DYE: CPT | Mod: RT

## 2023-12-31 ENCOUNTER — HOSPITAL ENCOUNTER (OUTPATIENT)
Facility: CLINIC | Age: 63
Discharge: HOME | End: 2023-12-31
Attending: HOSPITALIST
Payer: COMMERCIAL

## 2023-12-31 VITALS
DIASTOLIC BLOOD PRESSURE: 79 MMHG | SYSTOLIC BLOOD PRESSURE: 117 MMHG | HEART RATE: 79 BPM | OXYGEN SATURATION: 95 % | TEMPERATURE: 97.8 F

## 2023-12-31 DIAGNOSIS — J20.4 ACUTE BRONCHITIS DUE TO PARAINFLUENZA VIRUS: Primary | ICD-10-CM

## 2023-12-31 LAB
EXPIRATION DATE: NORMAL
Lab: NORMAL
POCT MANUFACTURER: NORMAL
RAPID INFLUENZA A AGN: NEGATIVE
RAPID INFLUENZA B AGN: NEGATIVE

## 2023-12-31 PROCEDURE — 87804 INFLUENZA ASSAY W/OPTIC: CPT | Performed by: NURSE PRACTITIONER

## 2023-12-31 PROCEDURE — S9083 URGENT CARE CENTER GLOBAL: HCPCS | Performed by: NURSE PRACTITIONER

## 2023-12-31 PROCEDURE — 99213 OFFICE O/P EST LOW 20 MIN: CPT | Performed by: NURSE PRACTITIONER

## 2023-12-31 RX ORDER — GLIMEPIRIDE 2 MG/1
2 TABLET ORAL DAILY
COMMUNITY
Start: 2023-11-27

## 2023-12-31 RX ORDER — ALBUTEROL SULFATE 90 UG/1
2 INHALANT RESPIRATORY (INHALATION) EVERY 4 HOURS PRN
Qty: 1 EACH | Refills: 0 | Status: SHIPPED | OUTPATIENT
Start: 2023-12-31 | End: 2024-01-30

## 2023-12-31 RX ORDER — METHYLPREDNISOLONE 4 MG/1
TABLET ORAL
Qty: 21 TABLET | Refills: 0 | Status: SHIPPED | OUTPATIENT
Start: 2023-12-31

## 2023-12-31 RX ORDER — BENZONATATE 100 MG/1
100 CAPSULE ORAL 3 TIMES DAILY PRN
Qty: 30 CAPSULE | Refills: 0 | Status: SHIPPED | OUTPATIENT
Start: 2023-12-31 | End: 2024-01-10

## 2023-12-31 ASSESSMENT — ENCOUNTER SYMPTOMS
COUGH: 1
RHINORRHEA: 1
FEVER: 1
CHILLS: 1
WHEEZING: 1

## 2023-12-31 NOTE — ED PROVIDER NOTES
Emergency Medicine Note  HPI   HISTORY OF PRESENT ILLNESS     Patient is 63-year-old female presents with cough and congestion for the last 5 to 6 days.  She reports she was around a group of people before her symptoms started who ended up having a lot of people test positive for the flu.  She assumes she had it because she had fevers chills and bodyaches.  She has since resolved with fevers but continues with cough and she thinks she has been wheezing.  She has not not taken a home COVID test.  She has not had a history of asthma wheezing but says she used an inhaler when she was in the hospital with COVID before.            Patient History   PAST HISTORY     Reviewed from Nursing Triage:       Past Medical History:   Diagnosis Date   • Depression with anxiety    • Diabetes mellitus (CMS/HCC)    • Fibromyalgia        Past Surgical History:   Procedure Laterality Date   • OVARIAN CYST REMOVAL         Family History   Problem Relation Age of Onset   • Diabetes Biological Father    • Breast cancer Biological Mother    • Diabetes Biological Mother        Social History     Tobacco Use   • Smoking status: Former     Packs/day: 1.00     Years: 10.00     Additional pack years: 0.00     Total pack years: 10.00     Types: Cigarettes     Start date:      Quit date:      Years since quittin.0   • Smokeless tobacco: Former   Substance Use Topics   • Alcohol use: Not Currently   • Drug use: No         Review of Systems   REVIEW OF SYSTEMS     Review of Systems   Constitutional: Positive for chills and fever.   HENT: Positive for congestion, postnasal drip and rhinorrhea.    Respiratory: Positive for cough and wheezing.    All other systems reviewed and are negative.        VITALS     ED Vitals    Date/Time Temp Pulse Resp BP SpO2 [facility]   23 1234 36.6 °C (97.8 °F) 79 -- 117/79 95 % KP                       Physical Exam   PHYSICAL EXAM     Physical Exam  Vitals and nursing note reviewed.   HENT:      Head:  Normocephalic.      Right Ear: Tympanic membrane normal.      Left Ear: Tympanic membrane normal.      Nose: No congestion or rhinorrhea.      Right Sinus: No maxillary sinus tenderness or frontal sinus tenderness.      Left Sinus: No maxillary sinus tenderness or frontal sinus tenderness.      Mouth/Throat:      Pharynx: Uvula midline. Posterior oropharyngeal erythema present. No pharyngeal swelling or oropharyngeal exudate.      Tonsils: No tonsillar exudate or tonsillar abscesses. 1+ on the right. 1+ on the left.   Eyes:      Conjunctiva/sclera: Conjunctivae normal.   Cardiovascular:      Rate and Rhythm: Normal rate and regular rhythm.      Heart sounds: Normal heart sounds.   Pulmonary:      Effort: Pulmonary effort is normal.      Breath sounds: Normal breath sounds and air entry.   Musculoskeletal:      Cervical back: Normal range of motion.   Lymphadenopathy:      Cervical: No cervical adenopathy.   Skin:     General: Skin is warm and dry.   Neurological:      Mental Status: She is alert.   Psychiatric:         Mood and Affect: Mood normal.         Behavior: Behavior normal.           PROCEDURES     Procedures     DATA     Results     None              No orders to display       Scoring tools                                  ED Course & MDM   MDM / ED COURSE / CLINICAL IMPRESSION / DISPO     Medical Decision Making  Lungs today were clear on exam.  No harsh cough noted today.  Rapid flu was negative although I thought I saw a faint line when asked in the clinic thought.  Treating her for viral bronchitis.   treating with Medrol Dosepak Tessalon Perles and rescue inhaler for when she hears herself wheeze.  I reviewed some techniques on use the inhaler.  Reviewed all the discharge instructions with her and some supportive care measures and she verbalized understanding agreement the plan             Clinical Impression      Acute bronchitis due to parainfluenza virus     _________________     ED Disposition    Discharge                   Yaquelin Calix CRNP  12/31/23 [id number]

## 2023-12-31 NOTE — DISCHARGE INSTRUCTIONS
Will treat for viral bronchitis Start steroid course today- take with food. No NSAIDS while on this course- may take Tylenol. Albuterol inhaler every 4 hours as needed for the coughing fits. Tessalon as needed for the cough especially to help you sleep. Saline nasal sprays twice a day and Flonase daily. Warm compresses to face and increase humidity. Push fluids. Follow up with PCP if not improving

## 2024-02-09 ENCOUNTER — TRANSCRIBE ORDERS (OUTPATIENT)
Dept: SCHEDULING | Age: 64
End: 2024-02-09

## 2024-02-09 DIAGNOSIS — S92.344G: Primary | ICD-10-CM

## 2024-02-24 ENCOUNTER — HOSPITAL ENCOUNTER (OUTPATIENT)
Dept: RADIOLOGY | Facility: HOSPITAL | Age: 64
Discharge: HOME | End: 2024-02-24
Attending: PODIATRIST
Payer: COMMERCIAL

## 2024-02-24 DIAGNOSIS — S92.344G: ICD-10-CM

## 2024-02-24 PROCEDURE — 73718 MRI LOWER EXTREMITY W/O DYE: CPT | Mod: RT

## 2024-04-11 ENCOUNTER — TRANSCRIBE ORDERS (OUTPATIENT)
Dept: SCHEDULING | Age: 64
End: 2024-04-11

## 2024-04-11 DIAGNOSIS — S92.901A UNSPECIFIED FRACTURE OF RIGHT FOOT, INITIAL ENCOUNTER FOR CLOSED FRACTURE: ICD-10-CM

## 2024-04-11 DIAGNOSIS — Z13.820 ENCOUNTER FOR SCREENING FOR OSTEOPOROSIS: Primary | ICD-10-CM

## 2024-04-15 ENCOUNTER — HOSPITAL ENCOUNTER (OUTPATIENT)
Dept: RADIOLOGY | Age: 64
Discharge: HOME | End: 2024-04-15
Attending: FAMILY MEDICINE
Payer: COMMERCIAL

## 2024-04-15 DIAGNOSIS — S92.901A UNSPECIFIED FRACTURE OF RIGHT FOOT, INITIAL ENCOUNTER FOR CLOSED FRACTURE: ICD-10-CM

## 2024-04-15 DIAGNOSIS — Z13.820 ENCOUNTER FOR SCREENING FOR OSTEOPOROSIS: ICD-10-CM

## 2024-04-15 PROCEDURE — 77080 DXA BONE DENSITY AXIAL: CPT

## 2024-06-03 ENCOUNTER — TRANSCRIBE ORDERS (OUTPATIENT)
Dept: REGISTRATION | Age: 64
End: 2024-06-03

## 2024-06-03 ENCOUNTER — HOSPITAL ENCOUNTER (OUTPATIENT)
Dept: RADIOLOGY | Age: 64
Discharge: HOME | End: 2024-06-03
Attending: FAMILY MEDICINE
Payer: COMMERCIAL

## 2024-06-03 DIAGNOSIS — Z12.31 ENCOUNTER FOR SCREENING MAMMOGRAM FOR MALIGNANT NEOPLASM OF BREAST: ICD-10-CM

## 2024-06-03 DIAGNOSIS — Z12.31 ENCOUNTER FOR SCREENING MAMMOGRAM FOR MALIGNANT NEOPLASM OF BREAST: Primary | ICD-10-CM

## 2024-06-03 PROCEDURE — 77063 BREAST TOMOSYNTHESIS BI: CPT

## 2025-02-28 ENCOUNTER — TELEPHONE (OUTPATIENT)
Dept: COLORECTAL SURGERY | Facility: CLINIC | Age: 65
End: 2025-02-28
Payer: COMMERCIAL

## 2025-03-24 ENCOUNTER — TELEPHONE (OUTPATIENT)
Dept: COLORECTAL SURGERY | Facility: CLINIC | Age: 65
End: 2025-03-24
Payer: COMMERCIAL

## 2025-07-23 ENCOUNTER — HOSPITAL ENCOUNTER (OUTPATIENT)
Dept: RADIOLOGY | Age: 65
Discharge: HOME | End: 2025-07-23
Payer: COMMERCIAL

## 2025-07-23 DIAGNOSIS — Z12.31 OTHER SCREENING MAMMOGRAM: ICD-10-CM

## 2025-07-23 PROCEDURE — 77067 SCR MAMMO BI INCL CAD: CPT
